# Patient Record
Sex: FEMALE | Race: WHITE | NOT HISPANIC OR LATINO | Employment: PART TIME | URBAN - METROPOLITAN AREA
[De-identification: names, ages, dates, MRNs, and addresses within clinical notes are randomized per-mention and may not be internally consistent; named-entity substitution may affect disease eponyms.]

---

## 2018-08-24 ENCOUNTER — HOSPITAL ENCOUNTER (OUTPATIENT)
Dept: GENERAL RADIOLOGY | Facility: HOSPITAL | Age: 18
Discharge: HOME | End: 2018-08-24
Attending: FAMILY MEDICINE | Admitting: FAMILY MEDICINE

## 2019-05-20 ENCOUNTER — CONVERSION ENCOUNTER (OUTPATIENT)
Dept: FAMILY MEDICINE CLINIC | Facility: CLINIC | Age: 19
End: 2019-05-20

## 2019-06-12 VITALS
RESPIRATION RATE: 18 BRPM | HEIGHT: 63 IN | BODY MASS INDEX: 45.54 KG/M2 | WEIGHT: 257 LBS | DIASTOLIC BLOOD PRESSURE: 80 MMHG | HEART RATE: 84 BPM | OXYGEN SATURATION: 98 % | SYSTOLIC BLOOD PRESSURE: 130 MMHG

## 2019-07-01 ENCOUNTER — OFFICE VISIT (OUTPATIENT)
Dept: FAMILY MEDICINE CLINIC | Facility: CLINIC | Age: 19
End: 2019-07-01

## 2019-07-01 VITALS
DIASTOLIC BLOOD PRESSURE: 81 MMHG | SYSTOLIC BLOOD PRESSURE: 120 MMHG | WEIGHT: 257.8 LBS | OXYGEN SATURATION: 96 % | BODY MASS INDEX: 45.68 KG/M2 | TEMPERATURE: 98.2 F | HEART RATE: 101 BPM | HEIGHT: 63 IN | RESPIRATION RATE: 18 BRPM

## 2019-07-01 DIAGNOSIS — J45.21 MILD INTERMITTENT ASTHMA WITH ACUTE EXACERBATION: Primary | ICD-10-CM

## 2019-07-01 DIAGNOSIS — J06.9 ACUTE URI: ICD-10-CM

## 2019-07-01 PROCEDURE — 99213 OFFICE O/P EST LOW 20 MIN: CPT | Performed by: FAMILY MEDICINE

## 2019-07-01 RX ORDER — ALBUTEROL SULFATE 90 UG/1
2 AEROSOL, METERED RESPIRATORY (INHALATION) EVERY 4 HOURS PRN
Qty: 1 INHALER | Refills: 1 | Status: SHIPPED | OUTPATIENT
Start: 2019-07-01 | End: 2021-06-30 | Stop reason: SDUPTHER

## 2019-07-01 RX ORDER — AZITHROMYCIN 250 MG/1
TABLET, FILM COATED ORAL
Qty: 6 TABLET | Refills: 1 | Status: SHIPPED | OUTPATIENT
Start: 2019-07-01 | End: 2020-07-01

## 2019-07-01 RX ORDER — PREDNISONE 20 MG/1
40 TABLET ORAL DAILY
Qty: 12 TABLET | Refills: 0 | Status: SHIPPED | OUTPATIENT
Start: 2019-07-01 | End: 2019-07-07

## 2019-07-01 NOTE — PROGRESS NOTES
Chief Complaint   Patient presents with   • URI       History of Present Illness:  Subjective   Kelsi Holland is a 18 y.o. female.   URI    This is a recurrent problem. The current episode started in the past 7 days (3-4 days ). The problem has been gradually worsening. Maximum temperature: She states she has not taken her temp but she states that she has felt really hot and then cold and she states she felt like she may have had one  Associated symptoms include congestion, coughing, ear pain, nausea, a plugged ear sensation, sinus pain, sneezing, a sore throat and wheezing. Pertinent negatives include no abdominal pain, chest pain, diarrhea, dysuria, headaches, joint pain, joint swelling, neck pain, rash, rhinorrhea, swollen glands or vomiting. She has tried increased fluids (Mucinex DM night with no relief ) for the symptoms. The treatment provided no relief.        Allergies:  No Known Allergies    Social History:  Social History     Socioeconomic History   • Marital status: Single     Spouse name: Not on file   • Number of children: Not on file   • Years of education: Not on file   • Highest education level: Not on file       Family History:  History reviewed. No pertinent family history.    Past Medical History :  Active Ambulatory Problems     Diagnosis Date Noted   • Mild intermittent asthma with acute exacerbation 07/01/2019     Resolved Ambulatory Problems     Diagnosis Date Noted   • No Resolved Ambulatory Problems     No Additional Past Medical History       Medication List:    Current Outpatient Medications:   •  albuterol sulfate HFA (VENTOLIN HFA) 108 (90 Base) MCG/ACT inhaler, Inhale 2 puffs Every 4 (Four) Hours As Needed for Wheezing., Disp: 1 inhaler, Rfl: 1  •  azithromycin (ZITHROMAX Z-ANGIE) 250 MG tablet, Take 2 tablets the first day, then 1 tablet daily for 4 days., Disp: 6 tablet, Rfl: 1  •  predniSONE (DELTASONE) 20 MG tablet, Take 2 tablets by mouth Daily for 6 days., Disp: 12 tablet, Rfl:  "0    Past Surgical History:  History reviewed. No pertinent surgical history.    Review Of Systems:  Review of Systems   HENT: Positive for congestion, ear pain, sneezing and sore throat. Negative for rhinorrhea.    Respiratory: Positive for cough and wheezing.    Cardiovascular: Negative for chest pain.   Gastrointestinal: Positive for nausea. Negative for abdominal pain, diarrhea and vomiting.   Genitourinary: Negative for dysuria.   Musculoskeletal: Negative for joint pain and neck pain.   Skin: Negative for rash.       The following portions of the patient's history were reviewed and updated as appropriate: allergies, current medications, past family history, past medical history, past social history, past surgical history and problem list.      Physical Exam:  Vital Signs:  /81   Pulse 101   Temp 98.2 °F (36.8 °C)   Resp 18   Ht 160 cm (63\")   Wt 117 kg (257 lb 12.8 oz)   SpO2 96%   BMI 45.67 kg/m²     Physical Exam   Constitutional: She is oriented to person, place, and time. She appears well-developed and well-nourished. She is active.   HENT:   Head: Normocephalic and atraumatic.   Nose: Nose normal.   Eyes: Conjunctivae and lids are normal. Pupils are equal, round, and reactive to light.   Neck: Normal range of motion. Neck supple.   Cardiovascular: Normal rate, regular rhythm, normal heart sounds and normal pulses.   Pulmonary/Chest: No respiratory distress. She has wheezes. She has rales.   Abdominal: Soft. Bowel sounds are normal.   Musculoskeletal: Normal range of motion.   Neurological: She is alert and oriented to person, place, and time.   Skin: Skin is warm and dry. Capillary refill takes less than 2 seconds.   Psychiatric: She has a normal mood and affect. Her behavior is normal. Judgment and thought content normal.   Vitals reviewed.        Assessment and Plan:  Diagnoses and all orders for this visit:    1. Mild intermittent asthma with acute exacerbation (Primary)  -     predniSONE " (DELTASONE) 20 MG tablet; Take 2 tablets by mouth Daily for 6 days.  Dispense: 12 tablet; Refill: 0  -     azithromycin (ZITHROMAX Z-ANGIE) 250 MG tablet; Take 2 tablets the first day, then 1 tablet daily for 4 days.  Dispense: 6 tablet; Refill: 1  -     albuterol sulfate HFA (VENTOLIN HFA) 108 (90 Base) MCG/ACT inhaler; Inhale 2 puffs Every 4 (Four) Hours As Needed for Wheezing.  Dispense: 1 inhaler; Refill: 1    2. Acute URI  Comments:  This is likely the cause of her astham exacerbation.

## 2019-09-30 ENCOUNTER — TELEPHONE (OUTPATIENT)
Dept: FAMILY MEDICINE CLINIC | Facility: CLINIC | Age: 19
End: 2019-09-30

## 2020-03-19 DIAGNOSIS — J02.9 SORE THROAT: Primary | ICD-10-CM

## 2020-03-19 RX ORDER — AMOXICILLIN AND CLAVULANATE POTASSIUM 500; 125 MG/1; MG/1
1 TABLET, FILM COATED ORAL 2 TIMES DAILY
Qty: 20 TABLET | Refills: 0 | Status: SHIPPED | OUTPATIENT
Start: 2020-03-19 | End: 2020-07-01

## 2020-03-20 ENCOUNTER — TELEPHONE (OUTPATIENT)
Dept: FAMILY MEDICINE CLINIC | Facility: CLINIC | Age: 20
End: 2020-03-20

## 2020-03-20 NOTE — TELEPHONE ENCOUNTER
Patient called in with URI, cough, chest tightness, SOA, sore throat. She advised to go to urgent care per Dr. Davis.

## 2020-07-01 ENCOUNTER — OFFICE VISIT (OUTPATIENT)
Dept: FAMILY MEDICINE CLINIC | Facility: CLINIC | Age: 20
End: 2020-07-01

## 2020-07-01 VITALS
HEIGHT: 63 IN | BODY MASS INDEX: 46.6 KG/M2 | RESPIRATION RATE: 18 BRPM | OXYGEN SATURATION: 97 % | DIASTOLIC BLOOD PRESSURE: 77 MMHG | TEMPERATURE: 98 F | HEART RATE: 73 BPM | WEIGHT: 263 LBS | SYSTOLIC BLOOD PRESSURE: 114 MMHG

## 2020-07-01 DIAGNOSIS — E28.2 PCOS (POLYCYSTIC OVARIAN SYNDROME): Primary | ICD-10-CM

## 2020-07-01 DIAGNOSIS — M25.531 WRIST PAIN, ACUTE, RIGHT: ICD-10-CM

## 2020-07-01 DIAGNOSIS — R00.2 HEART PALPITATIONS: ICD-10-CM

## 2020-07-01 PROCEDURE — 99213 OFFICE O/P EST LOW 20 MIN: CPT | Performed by: FAMILY MEDICINE

## 2020-07-01 RX ORDER — PROPRANOLOL HYDROCHLORIDE 40 MG/1
40 TABLET ORAL 2 TIMES DAILY PRN
Qty: 60 TABLET | Refills: 3 | Status: SHIPPED | OUTPATIENT
Start: 2020-07-01 | End: 2021-06-30

## 2020-07-01 RX ORDER — NORETHINDRONE ACETATE AND ETHINYL ESTRADIOL 1; .02 MG/1; MG/1
1 TABLET ORAL DAILY
Qty: 28 TABLET | Refills: 12 | Status: SHIPPED | OUTPATIENT
Start: 2020-07-01 | End: 2020-11-03

## 2020-07-01 NOTE — ASSESSMENT & PLAN NOTE
Patient was started on birth control pills to control her periods and metformin for PCOS.  Patient was encouraged return the clinic given 3 months for follow-up.

## 2020-07-01 NOTE — PROGRESS NOTES
"Chief Complaint   Patient presents with   • Wrist Pain     right wrist       History of Present Illness:  Lam Holland is a 19 y.o. female.   History of Present Illness   Wrist Pain:   Patient reports that she has had wrist pain for the past couple years. She reports sometimes she gets \"flare ups\" and during this time- she will have a noticeable bump on her hand/wrist. She reports that it has been continuous pain for just over a week- she was using her hands to dye and make bouquets for her wedding.   At this time she is complaining of pain with movement and she is right hand dominant. 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   She reports she has had decreased range of motion in her wrist and pain. She reports that when she over uses her hands/wrist this happens and it causes her hand/wrist to be sore.     Allergies:  No Known Allergies    Social History:  Social History     Socioeconomic History   • Marital status: Single     Spouse name: Not on file   • Number of children: Not on file   • Years of education: Not on file   • Highest education level: Not on file       Family History:  No family history on file.    Past Medical History :  Active Ambulatory Problems     Diagnosis Date Noted   • Mild intermittent asthma with acute exacerbation 07/01/2019   • Heart palpitations 07/01/2020   • PCOS (polycystic ovarian syndrome) 07/01/2020   • Wrist pain, acute, right 07/01/2020     Resolved Ambulatory Problems     Diagnosis Date Noted   • No Resolved Ambulatory Problems     No Additional Past Medical History       Medication List:  Outpatient Encounter Medications as of 7/1/2020   Medication Sig Dispense Refill   • albuterol sulfate HFA (VENTOLIN HFA) 108 (90 Base) MCG/ACT inhaler Inhale 2 puffs Every 4 (Four) Hours As Needed for Wheezing. 1 inhaler 1   • metFORMIN (GLUCOPHAGE) 500 MG tablet Take 1 tablet by mouth 3 (Three) Times a Day. 90 tablet 6   • norethindrone-ethinyl estradiol (Loestrin 1/20, " "21,) 1-20 MG-MCG per tablet Take 1 tablet by mouth Daily. 28 tablet 12   • propranolol (INDERAL) 40 MG tablet Take 1 tablet by mouth 2 (Two) Times a Day As Needed (palpitations). 60 tablet 3   • [DISCONTINUED] amoxicillin-clavulanate (Augmentin) 500-125 MG per tablet Take 1 tablet by mouth 2 (Two) Times a Day. 20 tablet 0   • [DISCONTINUED] azithromycin (ZITHROMAX Z-ANGIE) 250 MG tablet Take 2 tablets the first day, then 1 tablet daily for 4 days. 6 tablet 1     No facility-administered encounter medications on file as of 7/1/2020.        Past Surgical History:  No past surgical history on file.     The following portions of the patient's history were reviewed and updated as appropriate: allergies, current medications, past family history, past medical history, past social history, past surgical history and problem list.    Review Of Systems:  Review of Systems   Constitutional: Negative for activity change, appetite change and fatigue.   HENT: Negative for ear discharge, ear pain, postnasal drip and rhinorrhea.    Eyes: Negative for double vision and discharge.   Respiratory: Negative for cough, chest tightness and shortness of breath.    Cardiovascular: Positive for palpitations (Intermittent). Negative for chest pain.   Endocrine: Negative for cold intolerance and heat intolerance.   Genitourinary: Positive for menstrual problem (PCOS).   Musculoskeletal: Negative for back pain, gait problem and joint swelling.        Right wrist pain.   Skin: Negative for color change and rash.   Neurological: Negative for dizziness, facial asymmetry and confusion.   Psychiatric/Behavioral: Negative for behavioral problems.       Objective     Physical Exam:  Vital Signs:  Visit Vitals  /77   Pulse 73   Temp 98 °F (36.7 °C)   Resp 18   Ht 160 cm (63\")   Wt 119 kg (263 lb)   SpO2 97%   BMI 46.59 kg/m²       Physical Exam   Constitutional: She is oriented to person, place, and time. She appears well-developed and " well-nourished.   HENT:   Head: Normocephalic.   Right Ear: External ear normal.   Left Ear: External ear normal.   Nose: Nose normal.   Eyes: Conjunctivae are normal.   Neck: Normal range of motion. Neck supple.   Cardiovascular: Normal rate and regular rhythm.   Pulmonary/Chest: Effort normal and breath sounds normal.   Musculoskeletal: Normal range of motion.   Neurological: She is alert and oriented to person, place, and time.   Skin: Skin is warm and dry. Capillary refill takes less than 2 seconds.   Vitals reviewed.      Assessment/Plan   Assessment and Plan:  Diagnoses and all orders for this visit:    1. PCOS (polycystic ovarian syndrome) (Primary)  Assessment & Plan:  Patient was started on birth control pills to control her periods and metformin for PCOS.  Patient was encouraged return the clinic given 3 months for follow-up.    Orders:  -     metFORMIN (GLUCOPHAGE) 500 MG tablet; Take 1 tablet by mouth 3 (Three) Times a Day.  Dispense: 90 tablet; Refill: 6  -     norethindrone-ethinyl estradiol (Loestrin 1/20, 21,) 1-20 MG-MCG per tablet; Take 1 tablet by mouth Daily.  Dispense: 28 tablet; Refill: 12    2. Heart palpitations  Assessment & Plan:  She was started on Propranolol to help with her symptoms.     Orders:  -     propranolol (INDERAL) 40 MG tablet; Take 1 tablet by mouth 2 (Two) Times a Day As Needed (palpitations).  Dispense: 60 tablet; Refill: 3    3. Wrist pain, acute, right  Assessment & Plan:  This appears to be a ganglion cyst.  No prescription to be sent.  Patient was instructed to use heat and ibuprofen to treat her symptoms.

## 2020-11-03 ENCOUNTER — OFFICE VISIT (OUTPATIENT)
Dept: FAMILY MEDICINE CLINIC | Facility: CLINIC | Age: 20
End: 2020-11-03

## 2020-11-03 VITALS
HEIGHT: 64 IN | HEART RATE: 104 BPM | OXYGEN SATURATION: 98 % | DIASTOLIC BLOOD PRESSURE: 80 MMHG | RESPIRATION RATE: 18 BRPM | WEIGHT: 258 LBS | TEMPERATURE: 97.3 F | BODY MASS INDEX: 44.05 KG/M2 | SYSTOLIC BLOOD PRESSURE: 120 MMHG

## 2020-11-03 DIAGNOSIS — R50.9 FEVER, UNSPECIFIED FEVER CAUSE: ICD-10-CM

## 2020-11-03 DIAGNOSIS — J30.1 SEASONAL ALLERGIC RHINITIS DUE TO POLLEN: ICD-10-CM

## 2020-11-03 DIAGNOSIS — R05.9 COUGH: ICD-10-CM

## 2020-11-03 DIAGNOSIS — J06.9 UPPER RESPIRATORY TRACT INFECTION, UNSPECIFIED TYPE: Primary | ICD-10-CM

## 2020-11-03 PROBLEM — Z87.891 HISTORY OF TOBACCO USE: Status: ACTIVE | Noted: 2020-11-03

## 2020-11-03 PROBLEM — R06.89 BREATHING DIFFICULTY: Status: ACTIVE | Noted: 2020-11-03

## 2020-11-03 PROBLEM — Q62.5: Status: ACTIVE | Noted: 2020-11-03

## 2020-11-03 PROBLEM — IMO0002 BODY MASS INDEX 95-99% FOR AGE, OBESE CHILD WEIGHT MANAGE/MULTIDISCIPL: Status: ACTIVE | Noted: 2020-11-03

## 2020-11-03 PROBLEM — N94.6 DYSMENORRHEA: Status: ACTIVE | Noted: 2020-11-03

## 2020-11-03 PROBLEM — Z30.013 ENCOUNTER FOR INITIAL PRESCRIPTION OF INJECTABLE CONTRACEPTIVE: Status: ACTIVE | Noted: 2020-11-03

## 2020-11-03 PROBLEM — E66.9 BODY MASS INDEX 95-99% FOR AGE, OBESE CHILD WEIGHT MANAGE/MULTIDISCIPL: Status: ACTIVE | Noted: 2020-11-03

## 2020-11-03 PROBLEM — J30.9 ALLERGIC RHINITIS: Status: ACTIVE | Noted: 2020-11-03

## 2020-11-03 PROBLEM — Z13.9 ENCOUNTER FOR SCREENING: Status: ACTIVE | Noted: 2020-11-03

## 2020-11-03 PROBLEM — L03.113 CELLULITIS OF RIGHT UPPER EXTREMITY: Status: ACTIVE | Noted: 2020-11-03

## 2020-11-03 PROBLEM — Z30.09 BIRTH CONTROL COUNSELING: Status: ACTIVE | Noted: 2020-11-03

## 2020-11-03 PROBLEM — Z02.1 PHYSICAL EXAM, PRE-EMPLOYMENT: Status: ACTIVE | Noted: 2020-11-03

## 2020-11-03 PROBLEM — L20.9 ATOPIC DERMATITIS AND RELATED CONDITION: Status: ACTIVE | Noted: 2020-11-03

## 2020-11-03 PROBLEM — M13.0 POLYARTHROPATHY OR POLYARTHRITIS: Status: ACTIVE | Noted: 2020-11-03

## 2020-11-03 LAB
EXPIRATION DATE: NORMAL
FLUAV AG NPH QL: NEGATIVE
FLUBV AG NPH QL: NEGATIVE
INTERNAL CONTROL: NORMAL
Lab: NORMAL

## 2020-11-03 PROCEDURE — 87804 INFLUENZA ASSAY W/OPTIC: CPT | Performed by: FAMILY MEDICINE

## 2020-11-03 PROCEDURE — 99213 OFFICE O/P EST LOW 20 MIN: CPT | Performed by: FAMILY MEDICINE

## 2020-11-03 RX ORDER — MONTELUKAST SODIUM 10 MG/1
10 TABLET ORAL NIGHTLY
Qty: 30 TABLET | Refills: 12 | Status: SHIPPED | OUTPATIENT
Start: 2020-11-03 | End: 2021-06-30

## 2020-11-03 NOTE — PROGRESS NOTES
Subjective   Kelsi Holland is a 20 y.o. female.     Chief Complaint   Patient presents with   • URI       Kelsi was last Covid tested on 09/30/2020 Negative    URI   This is a new problem. The current episode started in the past 7 days. The problem has been gradually worsening. There has been no fever (Yesterday fever 100.5). Associated symptoms include chest pain, congestion, coughing, headaches, nausea, rhinorrhea and swollen glands. Pertinent negatives include no abdominal pain, diarrhea, dysuria, ear pain, plugged ear sensation, rash, sinus pain, sore throat, vomiting or wheezing. Associated symptoms comments: Eye pain. She has tried antihistamine for the symptoms. The treatment provided mild relief.        The following portions of the patient's history were reviewed and updated as appropriate: allergies, current medications, past family history, past medical history, past social history, past surgical history and problem list.    Allergies:  No Known Allergies    Social History:  Social History     Socioeconomic History   • Marital status:      Spouse name: Not on file   • Number of children: Not on file   • Years of education: Not on file   • Highest education level: Not on file   Tobacco Use   • Smoking status: Never Smoker   • Smokeless tobacco: Never Used   Substance and Sexual Activity   • Alcohol use: Never     Frequency: Never   • Drug use: Never   • Sexual activity: Defer       Family History:  Family History   Problem Relation Age of Onset   • Breast cancer Mother        Past Medical History :  Patient Active Problem List   Diagnosis   • Mild intermittent asthma with acute exacerbation   • Heart palpitations   • PCOS (polycystic ovarian syndrome)   • Wrist pain, acute, right   • History of chickenpox   • Allergic rhinitis   • Asthma   • Atopic dermatitis and related condition   • Body mass index 95-99% for age, obese child weight manage/multidiscipl   • Cellulitis of right upper extremity   •  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 1 inhaler 1   • metFORMIN (GLUCOPHAGE) 500 MG tablet Take 1 tablet by mouth 3 (Three) Times a Day. 90 tablet 6   • propranolol (INDERAL) 40 MG tablet Take 1 tablet by mouth 2 (Two) Times a Day As Needed (palpitations). 60 tablet 3   • montelukast (SINGULAIR) 10 MG tablet Take 1 tablet by mouth Every Night. 30 tablet 12   • [DISCONTINUED] norethindrone-ethinyl estradiol (Loestrin 1/20, 21,) 1-20 MG-MCG per tablet Take 1 tablet by mouth Daily. 28 tablet 12     No facility-administered encounter medications on file as of 11/3/2020.        Past Surgical History:  History reviewed. No pertinent surgical history.    Review of Systems:  Review of Systems   Constitutional: Positive for fever (low grade 100.5). Negative for unexpected weight gain and unexpected weight loss.   HENT: Positive for congestion, rhinorrhea, sinus pressure and swollen glands. Negative for ear pain and sore throat.    Eyes: Negative for visual disturbance.   Respiratory: Positive for cough. Negative for shortness of breath and wheezing.    Cardiovascular: Positive for chest pain. Negative for palpitations and leg swelling.   Gastrointestinal: Positive for nausea. Negative for abdominal pain, diarrhea and vomiting.   Endocrine: Negative for cold intolerance, heat intolerance, polydipsia and polyuria.   Genitourinary: Negative for dysuria.   Musculoskeletal: Negative for arthralgias and myalgias.  "  Skin: Negative for rash.   Allergic/Immunologic: Positive for environmental allergies.   Neurological: Negative for weakness, numbness and headache.   Hematological: Negative for adenopathy.       I have reviewed and confirmed the accuracy of the HPI and ROS as documented by the MA/LPN/RN Samantha Hobson MD    Vital Signs:  Visit Vitals  /80 (BP Location: Right arm, Patient Position: Sitting, Cuff Size: Adult)   Pulse 104   Temp 97.3 °F (36.3 °C) (Temporal)   Resp 18   Ht 162.6 cm (64\")   Wt 117 kg (258 lb)   LMP 10/01/2020   SpO2 98% Comment: room air   BMI 44.29 kg/m²       Physical Exam  Vitals signs reviewed.   Constitutional:       General: She is not in acute distress.     Appearance: She is well-developed.   HENT:      Right Ear: Tympanic membrane and external ear normal.      Left Ear: Tympanic membrane and external ear normal.      Mouth/Throat:      Pharynx: No oropharyngeal exudate or posterior oropharyngeal erythema.   Eyes:      Conjunctiva/sclera: Conjunctivae normal.   Neck:      Musculoskeletal: Neck supple.      Thyroid: No thyromegaly.      Vascular: No JVD.   Cardiovascular:      Rate and Rhythm: Normal rate and regular rhythm.      Heart sounds: Normal heart sounds. No murmur. No friction rub. No gallop.    Pulmonary:      Effort: Pulmonary effort is normal. No respiratory distress.      Breath sounds: Normal breath sounds. No wheezing or rales.   Abdominal:      General: Bowel sounds are normal. There is no distension.      Palpations: Abdomen is soft. There is no mass.      Tenderness: There is no abdominal tenderness.   Lymphadenopathy:      Cervical: No cervical adenopathy.   Skin:     General: Skin is warm.      Findings: No erythema or rash.   Neurological:      Mental Status: She is alert and oriented to person, place, and time.      Coordination: Coordination normal.         Assessment and Plan:  Problem List Items Addressed This Visit        Unprioritized    Allergic rhinitis "    Overview     Exacerbation on meds, add montelukast,  avoidance, saline flushes , notify us of no improvement         Relevant Medications    montelukast (SINGULAIR) 10 MG tablet      Other Visit Diagnoses     Upper respiratory tract infection, unspecified type    -  Primary    Suspect sxs are secondary to allergies, Flu swab negative. She will rest push fluids covid testing notify her of results. She will notify us if sxs worsen.     Cough        Relevant Orders    COVID-19,[facility] ROUTINE, NP/OP SWAB IN TRANSPORT MEDIA OR ESWAB 72 HR TAT - Swab, Anterior nasal (Completed)    Fever, unspecified fever cause        Relevant Orders    POC Influenza A / B (Completed)          An After Visit Summary and PPPS were given to the patient.       I wore protective equipment throughout this patient encounter to include mask and eye protection. Hand hygiene was performed before donning protective equipment and after removal when leaving the room.

## 2020-11-04 LAB — SARS-COV-2 RNA RESP QL NAA+PROBE: DETECTED

## 2020-11-06 ENCOUNTER — TELEPHONE (OUTPATIENT)
Dept: FAMILY MEDICINE CLINIC | Facility: CLINIC | Age: 20
End: 2020-11-06

## 2020-11-06 NOTE — TELEPHONE ENCOUNTER
Spoke with Hope,  She report no fever, slight  cough, unable to smell or taste., but over all feeling much better.

## 2020-11-08 ENCOUNTER — HOSPITAL ENCOUNTER (EMERGENCY)
Facility: HOSPITAL | Age: 20
Discharge: HOME | End: 2020-11-08
Attending: EMERGENCY MEDICINE | Admitting: EMERGENCY MEDICINE

## 2020-11-08 VITALS
SYSTOLIC BLOOD PRESSURE: 143 MMHG | HEART RATE: 93 BPM | OXYGEN SATURATION: 98 % | TEMPERATURE: 99.3 F | WEIGHT: 255.07 LBS | BODY MASS INDEX: 43.55 KG/M2 | DIASTOLIC BLOOD PRESSURE: 94 MMHG | HEIGHT: 64 IN | RESPIRATION RATE: 20 BRPM

## 2020-11-08 DIAGNOSIS — M79.604 RIGHT LEG PAIN: ICD-10-CM

## 2020-11-08 DIAGNOSIS — U07.1 COVID-19: Primary | ICD-10-CM

## 2020-11-08 PROCEDURE — 99281 EMR DPT VST MAYX REQ PHY/QHP: CPT

## 2020-11-09 NOTE — ED NOTES
"Patient stated she began having right leg pain. Patient stated \"it started once this virus started.\" Patient stated her right leg pain \"shoots down from my butt.\"     Kevan Asher RN  11/08/20 [id number]    "

## 2020-11-09 NOTE — ED PROVIDER NOTES
Subjective   History of Present Illness  20-year-old female presents Covid positive.  She was tested on the third.  She states her employer told her she needed to come in for evaluation tonight.  She states her temperature was 99 earlier today.  She has had cough.She complains of pain right leg lateral thigh.  She has had this for about 3 days.      Review of Systems  Negative for vomiting diarrhea   No past medical history on file.  Polycystic ovary disease  No Known Allergies    No past surgical history on file.    Family History   Problem Relation Age of Onset   • Breast cancer Mother        Social History     Socioeconomic History   • Marital status:      Spouse name: Not on file   • Number of children: Not on file   • Years of education: Not on file   • Highest education level: Not on file   Tobacco Use   • Smoking status: Never Smoker   • Smokeless tobacco: Never Used   Substance and Sexual Activity   • Alcohol use: Never     Frequency: Never   • Drug use: Never   • Sexual activity: Defer     Prior to Admission medications    Medication Sig Start Date End Date Taking? Authorizing Provider   albuterol sulfate HFA (VENTOLIN HFA) 108 (90 Base) MCG/ACT inhaler Inhale 2 puffs Every 4 (Four) Hours As Needed for Wheezing. 7/1/19   Richie Davis Sr., MD   metFORMIN (GLUCOPHAGE) 500 MG tablet Take 1 tablet by mouth 3 (Three) Times a Day. 7/1/20   Richie Davis Sr., MD   montelukast (SINGULAIR) 10 MG tablet Take 1 tablet by mouth Every Night. 11/3/20   Samantha Hobson MD   propranolol (INDERAL) 40 MG tablet Take 1 tablet by mouth 2 (Two) Times a Day As Needed (palpitations). 7/1/20   Richie Davis Sr., MD           Objective   Physical Exam  20-year-old female awake alert.  Generally overweight.  Pupils equal round react light.  Neck supple chest clear cardiovascular a rhythm abdomen soft nontender examination of legs complains of some pain in lateral aspect of right thigh.  There is no calf tenderness negative  Homans.  Reflexes 2+ symmetric.  Skin without rash noted.  Procedures           ED Course                                           MDM  Patient's findings were discussed with her.  She has pulse ox of 98% respirate of 20.  She has no evidence of suggest DVT in right leg on exam.  At this point she needs to continue with symptomatic treatment.  Advised to take 1 aspirin daily.  She also can use vitamin C zinc and vitamin D.  To follow-up with her PMD return new or worsening symptoms  Final diagnoses:   COVID-19   Right leg pain            Moises Moseley MD  11/08/20 [id number]

## 2020-11-09 NOTE — DISCHARGE INSTRUCTIONS
May use Tylenol, ibuprofen for pain or fever, take vitamin C vitamin D and zinc daily, take 1 aspirin daily, return new or worsening symptoms

## 2021-02-03 ENCOUNTER — TELEPHONE (OUTPATIENT)
Dept: FAMILY MEDICINE CLINIC | Facility: CLINIC | Age: 21
End: 2021-02-03

## 2021-02-05 ENCOUNTER — OFFICE VISIT (OUTPATIENT)
Dept: FAMILY MEDICINE CLINIC | Facility: CLINIC | Age: 21
End: 2021-02-05

## 2021-02-05 VITALS
SYSTOLIC BLOOD PRESSURE: 119 MMHG | TEMPERATURE: 98 F | BODY MASS INDEX: 44.05 KG/M2 | DIASTOLIC BLOOD PRESSURE: 84 MMHG | WEIGHT: 258 LBS | HEART RATE: 81 BPM | RESPIRATION RATE: 18 BRPM | OXYGEN SATURATION: 99 % | HEIGHT: 64 IN

## 2021-02-05 DIAGNOSIS — E66.01 MORBIDLY OBESE (HCC): ICD-10-CM

## 2021-02-05 DIAGNOSIS — S46.812A STRAIN OF LEFT TRAPEZIUS MUSCLE, INITIAL ENCOUNTER: ICD-10-CM

## 2021-02-05 DIAGNOSIS — S29.012A STRAIN OF LATISSIMUS DORSI MUSCLE, INITIAL ENCOUNTER: ICD-10-CM

## 2021-02-05 DIAGNOSIS — T30.0 FRICTION BURN: ICD-10-CM
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
## 2021-02-05 DIAGNOSIS — T70.0XXD OTITIC BAROTRAUMA, SUBSEQUENT ENCOUNTER: ICD-10-CM

## 2021-02-05 PROBLEM — T70.0XXA OTITIC BAROTRAUMA: Status: ACTIVE | Noted: 2021-02-05

## 2021-02-05 PROCEDURE — 99213 OFFICE O/P EST LOW 20 MIN: CPT | Performed by: FAMILY MEDICINE

## 2021-02-05 RX ORDER — METHYLPREDNISOLONE 4 MG/1
TABLET ORAL
COMMUNITY
Start: 2021-02-03 | End: 2021-02-10

## 2021-02-05 RX ORDER — TIZANIDINE 4 MG/1
4 TABLET ORAL NIGHTLY PRN
Qty: 30 TABLET | Refills: 0 | Status: SHIPPED | OUTPATIENT
Start: 2021-02-05 | End: 2021-04-08

## 2021-02-05 RX ORDER — GABAPENTIN 300 MG/1
300 CAPSULE ORAL DAILY
Qty: 30 CAPSULE | Refills: 12 | Status: SHIPPED | OUTPATIENT
Start: 2021-02-05 | End: 2021-02-18

## 2021-02-05 NOTE — ASSESSMENT & PLAN NOTE
Strain in the latissimus dorsi is due to the accident.  Patient was already given prescription for Medrol Dosepak and diclofenac.  Patient was also given prescription of gabapentin and tizanidine to help with muscle strain.
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
## 2021-02-10 ENCOUNTER — OFFICE VISIT (OUTPATIENT)
Dept: FAMILY MEDICINE CLINIC | Facility: CLINIC | Age: 21
End: 2021-02-10

## 2021-02-10 VITALS
WEIGHT: 259.6 LBS | SYSTOLIC BLOOD PRESSURE: 126 MMHG | BODY MASS INDEX: 44.32 KG/M2 | OXYGEN SATURATION: 98 % | HEART RATE: 92 BPM | TEMPERATURE: 98.1 F | HEIGHT: 64 IN | DIASTOLIC BLOOD PRESSURE: 80 MMHG | RESPIRATION RATE: 18 BRPM

## 2021-02-10 DIAGNOSIS — Z13.220 SCREENING FOR HYPERLIPIDEMIA: ICD-10-CM

## 2021-02-10 DIAGNOSIS — Z13.29 SCREENING FOR HYPOTHYROIDISM: ICD-10-CM

## 2021-02-10 DIAGNOSIS — R53.81 MALAISE AND FATIGUE: ICD-10-CM

## 2021-02-10 DIAGNOSIS — Z00.00 PHYSICAL EXAM: Primary | ICD-10-CM

## 2021-02-10 DIAGNOSIS — R53.83 MALAISE AND FATIGUE: ICD-10-CM

## 2021-02-10 DIAGNOSIS — E66.01 MORBID (SEVERE) OBESITY DUE TO EXCESS CALORIES (HCC): ICD-10-CM

## 2021-02-10 PROBLEM — J45.909 ASTHMA: Status: ACTIVE | Noted: 2021-02-10

## 2021-02-10 PROCEDURE — 99395 PREV VISIT EST AGE 18-39: CPT | Performed by: FAMILY MEDICINE

## 2021-02-10 RX ORDER — PHENTERMINE HYDROCHLORIDE 37.5 MG/1
37.5 CAPSULE ORAL EVERY MORNING
Qty: 30 CAPSULE | Refills: 0 | Status: SHIPPED | OUTPATIENT
Start: 2021-02-10 | End: 2021-03-11 | Stop reason: SDUPTHER

## 2021-02-10 NOTE — PROGRESS NOTES
"Chief Complaint  Annual Exam    Subjective    History of Present Illness        Kelsi Holland presents to [facility] for     Hope KASEY Holland is a 20 y.o. female here for her annual physical with me. Kelsi is here for coordination of medical care, to discuss health maintenance, disease prevention as well as to followup on medical problems. Patient has been followed by me for some time now. Patient's last CPE was last year . Activity level is minimal. Exercises 3 per week. Appetite is fair. Feels well with few complaints. Energy level is fair. Sleeps well. Patient is doing routine self skin exam monthly. Patient is doing routine self-breast exams monthly.         Objective   Vital Signs:   Visit Vitals  /80   Pulse 92   Temp 98.1 °F (36.7 °C)   Resp 18   Ht 162.6 cm (64\")   Wt 118 kg (259 lb 9.6 oz)   SpO2 98%   BMI 44.56 kg/m²       Physical Exam  Vitals signs reviewed.   Constitutional:       Appearance: She is well-developed.   HENT:      Head: Normocephalic and atraumatic.      Nose: Nose normal.   Eyes:      General: Lids are normal.      Conjunctiva/sclera: Conjunctivae normal.      Pupils: Pupils are equal, round, and reactive to light.   Neck:      Musculoskeletal: Normal range of motion and neck supple.   Cardiovascular:      Rate and Rhythm: Normal rate and regular rhythm.      Pulses: Normal pulses.      Heart sounds: Normal heart sounds.   Pulmonary:      Effort: Pulmonary effort is normal. No respiratory distress.      Breath sounds: Normal breath sounds.   Abdominal:      General: Bowel sounds are normal.      Palpations: Abdomen is soft.   Musculoskeletal: Normal range of motion.   Skin:     General: Skin is warm and dry.      Capillary Refill: Capillary refill takes less than 2 seconds.   Neurological:      Mental Status: She is alert and oriented to person, place, and time.   Psychiatric:         Behavior: Behavior normal.         Thought Content: Thought content " normal.         Judgment: Judgment normal.            Result Review :                    Assessment and Plan      Diagnoses and all orders for this visit:    1. Physical exam (Primary)  Assessment & Plan:  Discussed injury prevention, diet and exercise, safe sexual practices, and screening for common diseases. Encouraged use of sunscreen and seatbelts. Discussed timing of  cervical cancer screening. Encouraged monthly self-breast exams, yearly clinical breast exams, and discussed timing of mammograms. Avoidance of tobacco encouraged. Limitation or avoidance of alcohol encouraged. Recommend yearly dental and eye exams. Also discussed monitoring of blood pressure, lipids.      2. Screening for hyperlipidemia  -     CBC & Differential  -     Comprehensive Metabolic Panel  -     Lipid Panel With / Chol / HDL Ratio    3. Screening for hypothyroidism  -     TSH    4. Malaise and fatigue  -     Vitamin D 25 Hydroxy  -     Vitamin B12  -     Folate    5. Morbid (severe) obesity due to excess calories (CMS/[facility])  Assessment & Plan:  Patient's (Body mass index is 44.56 kg/m².) indicates that they are obese (BMI >30) with obesity-related health conditions that include none . Obesity is worsening. BMI is is above average; BMI management plan is completed. We discussed portion control, increasing exercise and pharmacologic options including phenteramine.     Orders:  -     phentermine 37.5 MG capsule; Take 1 capsule by mouth Every Morning.  Dispense: 30 capsule; Refill: 0    Other orders  -     Cancel: Hemoglobin A1c           Follow Up   No follow-ups on file.  Patient was given instructions and counseling regarding her condition or for health maintenance advice. Please see specific information pulled into the AVS if appropriate.

## 2021-02-11 PROBLEM — Z00.00 PHYSICAL EXAM: Status: ACTIVE | Noted: 2021-02-11

## 2021-02-11 LAB
25(OH)D3+25(OH)D2 SERPL-MCNC: 28.9 NG/ML (ref 30–100)
ALBUMIN SERPL-MCNC: 4 G/DL (ref 3.9–5)
ALBUMIN/GLOB SERPL: 1.4 {RATIO} (ref 1.2–2.2)
ALP SERPL-CCNC: 90 IU/L (ref 39–117)
ALT SERPL-CCNC: 21 IU/L (ref 0–32)
AST SERPL-CCNC: 13 IU/L (ref 0–40)
BASOPHILS # BLD AUTO: 0.1 X10E3/UL (ref 0–0.2)
BASOPHILS NFR BLD AUTO: 1 %
BILIRUB SERPL-MCNC: 0.2 MG/DL (ref 0–1.2)
BUN SERPL-MCNC: 17 MG/DL (ref 6–20)
BUN/CREAT SERPL: 25 (ref 9–23)
CALCIUM SERPL-MCNC: 9.6 MG/DL (ref 8.7–10.2)
CHLORIDE SERPL-SCNC: 105 MMOL/L (ref 96–106)
CHOLEST SERPL-MCNC: 150 MG/DL (ref 100–199)
CHOLEST/HDLC SERPL: 3.2 RATIO (ref 0–4.4)
CO2 SERPL-SCNC: 23 MMOL/L (ref 20–29)
CREAT SERPL-MCNC: 0.69 MG/DL (ref 0.57–1)
EOSINOPHIL # BLD AUTO: 0.1 X10E3/UL (ref 0–0.4)
EOSINOPHIL NFR BLD AUTO: 2 %
ERYTHROCYTE [DISTWIDTH] IN BLOOD BY AUTOMATED COUNT: 13.7 % (ref 11.7–15.4)
FOLATE SERPL-MCNC: 8.8 NG/ML
GLOBULIN SER CALC-MCNC: 2.9 G/DL (ref 1.5–4.5)
GLUCOSE SERPL-MCNC: 83 MG/DL (ref 65–99)
HCT VFR BLD AUTO: 40.6 % (ref 34–46.6)
HDLC SERPL-MCNC: 47 MG/DL
HGB BLD-MCNC: 13.6 G/DL (ref 11.1–15.9)
IMM GRANULOCYTES # BLD AUTO: 0 X10E3/UL (ref 0–0.1)
IMM GRANULOCYTES NFR BLD AUTO: 0 %
LDLC SERPL CALC-MCNC: 80 MG/DL (ref 0–99)
LYMPHOCYTES # BLD AUTO: 2.4 X10E3/UL (ref 0.7–3.1)
LYMPHOCYTES NFR BLD AUTO: 26 %
MCH RBC QN AUTO: 26.7 PG (ref 26.6–33)
MCHC RBC AUTO-ENTMCNC: 33.5 G/DL (ref 31.5–35.7)
MCV RBC AUTO: 80 FL (ref 79–97)
MONOCYTES # BLD AUTO: 0.7 X10E3/UL (ref 0.1–0.9)
MONOCYTES NFR BLD AUTO: 7 %
NEUTROPHILS # BLD AUTO: 6 X10E3/UL (ref 1.4–7)
NEUTROPHILS NFR BLD AUTO: 64 %
PLATELET # BLD AUTO: 361 X10E3/UL (ref 150–450)
POTASSIUM SERPL-SCNC: 4.8 MMOL/L (ref 3.5–5.2)
PROT SERPL-MCNC: 6.9 G/DL (ref 6–8.5)
RBC # BLD AUTO: 5.09 X10E6/UL (ref 3.77–5.28)
SODIUM SERPL-SCNC: 142 MMOL/L (ref 134–144)
TRIGL SERPL-MCNC: 130 MG/DL (ref 0–149)
TSH SERPL DL<=0.005 MIU/L-ACNC: 1.89 UIU/ML (ref 0.45–4.5)
VIT B12 SERPL-MCNC: 520 PG/ML (ref 232–1245)
VLDLC SERPL CALC-MCNC: 23 MG/DL (ref 5–40)
WBC # BLD AUTO: 9.3 X10E3/UL (ref 3.4–10.8)

## 2021-02-11 NOTE — ASSESSMENT & PLAN NOTE
Patient's (Body mass index is 44.56 kg/m².) indicates that they are obese (BMI >30) with obesity-related health conditions that include none . Obesity is worsening. BMI is is above average; BMI management plan is completed. We discussed portion control, increasing exercise and pharmacologic options including phenteramine.

## 2021-02-18 ENCOUNTER — OFFICE VISIT (OUTPATIENT)
Dept: FAMILY MEDICINE CLINIC | Facility: CLINIC | Age: 21
End: 2021-02-18

## 2021-02-18 VITALS
WEIGHT: 253 LBS | SYSTOLIC BLOOD PRESSURE: 121 MMHG | OXYGEN SATURATION: 99 % | TEMPERATURE: 98 F | DIASTOLIC BLOOD PRESSURE: 85 MMHG | HEIGHT: 64 IN | RESPIRATION RATE: 18 BRPM | HEART RATE: 76 BPM | BODY MASS INDEX: 43.19 KG/M2

## 2021-02-18 DIAGNOSIS — E66.01 MORBIDLY OBESE (HCC): ICD-10-CM

## 2021-02-18 DIAGNOSIS — S46.812D STRAIN OF LEFT TRAPEZIUS MUSCLE, SUBSEQUENT ENCOUNTER: ICD-10-CM
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
## 2021-02-18 PROCEDURE — 99213 OFFICE O/P EST LOW 20 MIN: CPT | Performed by: FAMILY MEDICINE
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
## 2021-02-19 NOTE — ASSESSMENT & PLAN NOTE
Patient is still having some discomfort and soreness.  Patient was advised to use heat and massage her muscles.  Patient was advised to return to clinic if her symptoms are not improved in 2 to 4 weeks

## 2021-02-22 ENCOUNTER — PATIENT MESSAGE (OUTPATIENT)
Dept: FAMILY MEDICINE CLINIC | Facility: CLINIC | Age: 21
End: 2021-02-22
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
## 2021-02-22 DIAGNOSIS — S46.812D STRAIN OF LEFT TRAPEZIUS MUSCLE, SUBSEQUENT ENCOUNTER: Primary | ICD-10-CM

## 2021-02-22 DIAGNOSIS — M54.2 NECK PAIN: ICD-10-CM

## 2021-02-25 NOTE — TELEPHONE ENCOUNTER
From: Kelsi Holland  To: Richie Davis Sr, MD  Sent: 2/22/2021 9:29 PM EST  Subject: Visit Follow-Up Question    I hate to keep bugging you guys. I've been taking Tizanidine for pain in my left shoulder. I had mentioned the fear of taking them and was advised to take a half of a pill or less. I came home that night and took a full one because my head and shoulder was killing me. I've since been taking a full one almost every day. I noticed a day I forgot to take it, I woke up extremely sore and was sore with a very bad headache nearly all day the next day. I have also noticed it hurts both shoulders to carry any weight including my purse. My  has tried rubbing my shoulder and it hurts too much. At what point should I come back into the office to get a referral to do some therapy? The pain is to the point by the end of the day it kills me to lift anything or even hold my arms up. Just from walking about 3 blocks to get to work and carrying my purse on my shoulders and then in my hand (alternating), my shoulders were killing me which is also causing more headaches throughout the day,   making it harder to even function. The muscle relaxers I have, work great when they kick in at night to go to bed, but it's hard to fall asleep before they kick in because of the muscle pain.

## 2021-03-11 ENCOUNTER — OFFICE VISIT (OUTPATIENT)
Dept: FAMILY MEDICINE CLINIC | Facility: CLINIC | Age: 21
End: 2021-03-11

## 2021-03-11 VITALS
TEMPERATURE: 98.1 F | HEART RATE: 77 BPM | DIASTOLIC BLOOD PRESSURE: 81 MMHG | HEIGHT: 64 IN | WEIGHT: 246.6 LBS | RESPIRATION RATE: 18 BRPM | BODY MASS INDEX: 42.1 KG/M2 | OXYGEN SATURATION: 99 % | SYSTOLIC BLOOD PRESSURE: 118 MMHG

## 2021-03-11 DIAGNOSIS — E66.01 MORBID (SEVERE) OBESITY DUE TO EXCESS CALORIES (HCC): Primary | ICD-10-CM

## 2021-03-11 DIAGNOSIS — E55.9 VITAMIN D DEFICIENCY: ICD-10-CM

## 2021-03-11 PROCEDURE — 99213 OFFICE O/P EST LOW 20 MIN: CPT | Performed by: FAMILY MEDICINE

## 2021-03-11 RX ORDER — PHENTERMINE HYDROCHLORIDE 37.5 MG/1
37.5 CAPSULE ORAL EVERY MORNING
Qty: 30 CAPSULE | Refills: 0 | Status: SHIPPED | OUTPATIENT
Start: 2021-03-11 | End: 2021-04-08

## 2021-03-11 RX ORDER — PRENATAL VIT NO.126/IRON/FOLIC 28MG-0.8MG
1 TABLET ORAL DAILY
COMMUNITY
End: 2022-02-09

## 2021-03-11 NOTE — PROGRESS NOTES
Chief Complaint  Labs Only, Vitamin D Deficiency, and Obesity    Subjective    History of Present Illness        Kelsi Holland presents to [facility] for   3/11/2021- The patient is here today and she is wanting to follow up on the labs that she had done. Her vitamin d level was low and she states that she was taking a vitamin d over the counter but she states that she started a pre  and she state that this has the vitamin d in it. The rest of her labs looked good over all.       Obesity  This is a chronic problem. The current episode started more than 1 year ago. The problem occurs constantly. The problem has been unchanged. Pertinent negatives include no abdominal pain, anorexia, arthralgias, change in bowel habit, chest pain, chills, congestion, coughing, diaphoresis, fatigue, fever, headaches, joint swelling, myalgias, nausea, neck pain, numbness, rash, sore throat, swollen glands, urinary symptoms, vertigo, visual change, vomiting or weakness. Associated symptoms comments: 3/11/2021- The patient is here today and following up on her weight loss. The last time she was here she was started on Phentermine to try and help her loose some of her weight. She states that he has started some prenatal vitamins as well and she states that these constipated her and she states that she has been watching what she is eating and she states that she has been eating smaller calories as well. She states that she is trying to avoid carb's as well. She states that she is not just wanting to diet as she states that she is trying to make a lifestyle change but she states  that she has been doing what she can and she states that she has seen a difference and she states that the weight is slowly coming off. . Nothing aggravates the symptoms. She has tried nothing for the symptoms. The treatment provided no relief.        Objective   Vital Signs:   Visit Vitals  /81   Pulse 77   Temp 98.1 °F  "(36.7 °C)   Resp 18   Ht 162.6 cm (64\")   Wt 112 kg (246 lb 9.6 oz)   SpO2 99%   BMI 42.33 kg/m²       Physical Exam  Vitals reviewed.   Constitutional:       Appearance: She is well-developed.   HENT:      Head: Normocephalic.      Right Ear: External ear normal.      Left Ear: External ear normal.      Nose: Nose normal.   Eyes:      Conjunctiva/sclera: Conjunctivae normal.   Cardiovascular:      Rate and Rhythm: Normal rate and regular rhythm.   Pulmonary:      Effort: Pulmonary effort is normal.      Breath sounds: Normal breath sounds.   Musculoskeletal:         General: Normal range of motion.      Cervical back: Normal range of motion and neck supple.   Skin:     General: Skin is warm and dry.      Capillary Refill: Capillary refill takes less than 2 seconds.   Neurological:      Mental Status: She is alert and oriented to person, place, and time.            Result Review :      Common labs    Common Labsle 2/10/21 2/10/21 2/10/21    1541 1541 1541   Glucose  83    BUN  17    Creatinine  0.69    eGFR Non  Am  124    eGFR African Am  143    Sodium  142    Potassium  4.8    Chloride  105    Calcium  9.6    Total Protein  6.9    Albumin  4.0    Total Bilirubin  0.2    Alkaline Phosphatase  90    AST (SGOT)  13    ALT (SGPT)  21    WBC 9.3     Hemoglobin 13.6     Hematocrit 40.6     Platelets 361     Total Cholesterol   150   Triglycerides   130   HDL Cholesterol   47   LDL Cholesterol    80           CMP    CMP 2/10/21   Glucose 83   BUN 17   Creatinine 0.69   eGFR Non  Am 124   eGFR African Am 143   Sodium 142   Potassium 4.8   Chloride 105   Calcium 9.6   Total Protein 6.9   Albumin 4.0   Globulin 2.9   Total Bilirubin 0.2   Alkaline Phosphatase 90   AST (SGOT) 13   ALT (SGPT) 21           CBC    CBC 2/10/21   WBC 9.3   RBC 5.09   Hemoglobin 13.6   Hematocrit 40.6   MCV 80   MCH 26.7   MCHC 33.5   RDW 13.7   Platelets 361           CBC w/diff    CBC w/Diff 2/10/21   WBC 9.3   RBC 5.09 "   Hemoglobin 13.6   Hematocrit 40.6   MCV 80   MCH 26.7   MCHC 33.5   RDW 13.7   Platelets 361   Neutrophil Rel % 64   Lymphocyte Rel % 26   Monocyte Rel % 7   Eosinophil Rel % 2   Basophil Rel % 1           Lipid Panel    Lipid Panel 2/10/21   Total Cholesterol 150   Triglycerides 130   HDL Cholesterol 47   VLDL Cholesterol 23   LDL Cholesterol  80           TSH    TSH 2/10/21   TSH 1.890                       Assessment and Plan      Diagnoses and all orders for this visit:    1. Morbid (severe) obesity due to excess calories (CMS/HCC) (Primary)  Assessment & Plan:  Patient's (Body mass index is 42.33 kg/m².) indicates that they are morbidly obese (BMI > 40 or > 35 with obesity - related health condition) with obesity-related health conditions that include none . Obesity is improving with treatment. BMI is is above average; BMI management plan is completed. We discussed portion control, increasing exercise and pharmacologic options including Phentermine.  Patient's weight goal lose 10 pounds in 1 month.    Orders:  -     phentermine 37.5 MG capsule; Take 1 capsule by mouth Every Morning.  Dispense: 30 capsule; Refill: 0    2. Vitamin D deficiency           Follow Up   No follow-ups on file.  Patient was given instructions and counseling regarding her condition or for health maintenance advice. Please see specific information pulled into the AVS if appropriate.

## 2021-03-12 NOTE — ASSESSMENT & PLAN NOTE
Patient's (Body mass index is 42.33 kg/m².) indicates that they are morbidly obese (BMI > 40 or > 35 with obesity - related health condition) with obesity-related health conditions that include none . Obesity is improving with treatment. BMI is is above average; BMI management plan is completed. We discussed portion control, increasing exercise and pharmacologic options including Phentermine.  Patient's weight goal lose 10 pounds in 1 month.

## 2021-03-15 ENCOUNTER — TREATMENT (OUTPATIENT)
Dept: PHYSICAL THERAPY | Facility: CLINIC | Age: 21
End: 2021-03-15

## 2021-03-15 DIAGNOSIS — M54.2 CERVICALGIA: ICD-10-CM

## 2021-03-15 DIAGNOSIS — S46.812A STRAIN OF LEFT TRAPEZIUS MUSCLE, INITIAL ENCOUNTER: Primary | ICD-10-CM

## 2021-03-15 PROCEDURE — 97140 MANUAL THERAPY 1/> REGIONS: CPT | Performed by: PHYSICAL THERAPIST

## 2021-03-15 PROCEDURE — 97014 ELECTRIC STIMULATION THERAPY: CPT | Performed by: PHYSICAL THERAPIST

## 2021-03-15 PROCEDURE — 97161 PT EVAL LOW COMPLEX 20 MIN: CPT | Performed by: PHYSICAL THERAPIST
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
## 2021-03-22 ENCOUNTER — TREATMENT (OUTPATIENT)
Dept: PHYSICAL THERAPY | Facility: CLINIC | Age: 21
End: 2021-03-22

## 2021-03-22 DIAGNOSIS — S46.812A STRAIN OF LEFT TRAPEZIUS MUSCLE, INITIAL ENCOUNTER: Primary | ICD-10-CM

## 2021-03-22 PROCEDURE — 97140 MANUAL THERAPY 1/> REGIONS: CPT | Performed by: PHYSICAL THERAPIST

## 2021-03-22 PROCEDURE — 97110 THERAPEUTIC EXERCISES: CPT | Performed by: PHYSICAL THERAPIST

## 2021-03-22 PROCEDURE — 97014 ELECTRIC STIMULATION THERAPY: CPT | Performed by: PHYSICAL THERAPIST
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
## 2021-04-05 ENCOUNTER — TREATMENT (OUTPATIENT)
Dept: PHYSICAL THERAPY | Facility: CLINIC | Age: 21
End: 2021-04-05

## 2021-04-05 DIAGNOSIS — M54.2 CERVICALGIA: ICD-10-CM

## 2021-04-05 DIAGNOSIS — S46.812A STRAIN OF LEFT TRAPEZIUS MUSCLE, INITIAL ENCOUNTER: Primary | ICD-10-CM

## 2021-04-05 PROCEDURE — 97140 MANUAL THERAPY 1/> REGIONS: CPT | Performed by: PHYSICAL THERAPIST

## 2021-04-05 PROCEDURE — 97110 THERAPEUTIC EXERCISES: CPT | Performed by: PHYSICAL THERAPIST

## 2021-04-05 PROCEDURE — 97014 ELECTRIC STIMULATION THERAPY: CPT | Performed by: PHYSICAL THERAPIST

## 2021-04-05 NOTE — PROGRESS NOTES
"Physical Therapy Daily Progress Note      Patient: Kelsi Holland   : 2000  Diagnosis/ICD-10 Code:  Strain of left trapezius muscle, initial encounter [S46.812A]   Problems Addressed this Visit        Musculoskeletal and Injuries    Strain of left trapezius muscle - Primary      Other Visit Diagnoses     Cervicalgia          Diagnoses       Codes Comments    Strain of left trapezius muscle, initial encounter    -  Primary ICD-10-CM: S46.812A  ICD-9-CM: 840.8     Cervicalgia     ICD-10-CM: M54.2  ICD-9-CM: 723.1          Referring practitioner: Richie Davis Sr., MD  Date of Initial Visit: Type: THERAPY  Noted: 3/15/2021  Today's Date: 2021    VISIT#: 3    Subjective Pt stated she has not had a headache today.  Interested in trying dry needling to help with stiffness/soreness.  Does still have inc pain when looking down for long periods at work.  Did well last visit.  Frustrated with insurance and difficulty getting approval for treatment of her neck/shoulder pain.        Objective   DRY NEEDLING  subocciptials x 2 e B 1\"  Spinal accessory B (UT) 2\"    See Exercise, Manual, and Modality Logs for complete treatment.     Assessment/Plan Pt tolerated tx well.  Good initial glendy to dry needling.      Progress per Plan of Care         Timed:         Manual Therapy:    15     mins  88932;     Therapeutic Exercise:    10     mins  59329;     Neuromuscular Carolina:        mins  01557;    Therapeutic Activity:          mins  69260;     Gait Training:           mins  15441;     Ultrasound:          mins  91742;    Ionto                                   mins   26957  Self Care                            mins   84326  Canalith Repos                   mins  04815    Un-Timed:  Electrical Stimulation:    15     mins  56846 ( );  Dry Needling          mins self-pay  Traction          mins 86214  Low Eval          Mins  74341  Mod Eval          Mins  74270  High Eval                            Mins  85839  Re-Eval        "                        mins  35724    Timed Treatment:   25   mins   Total Treatment:     50   mins    Jocelin Riojas, PT  Physical Therapist

## 2021-04-08 ENCOUNTER — OFFICE VISIT (OUTPATIENT)
Dept: FAMILY MEDICINE CLINIC | Facility: CLINIC | Age: 21
End: 2021-04-08

## 2021-04-08 VITALS
DIASTOLIC BLOOD PRESSURE: 79 MMHG | OXYGEN SATURATION: 97 % | TEMPERATURE: 98.6 F | WEIGHT: 246.8 LBS | HEIGHT: 64 IN | RESPIRATION RATE: 18 BRPM | HEART RATE: 82 BPM | BODY MASS INDEX: 42.14 KG/M2 | SYSTOLIC BLOOD PRESSURE: 125 MMHG

## 2021-04-08 DIAGNOSIS — E66.01 MORBID (SEVERE) OBESITY DUE TO EXCESS CALORIES (HCC): ICD-10-CM

## 2021-04-08 DIAGNOSIS — Z33.1 INCIDENTAL PREGNANCY: ICD-10-CM

## 2021-04-08 DIAGNOSIS — M25.561 ACUTE PAIN OF RIGHT KNEE: Primary | ICD-10-CM

## 2021-04-08 PROCEDURE — 99214 OFFICE O/P EST MOD 30 MIN: CPT | Performed by: FAMILY MEDICINE

## 2021-04-08 NOTE — PROGRESS NOTES
"Chief Complaint  Knee Pain    Subjective    History of Present Illness        Kelsi Holland presents to [facility] for         Knee Pain   The incident occurred more than 1 week ago. There was no injury mechanism. The pain is present in the left knee. The quality of the pain is described as aching, shooting and cramping. The pain is at a severity of 5/10. The pain is mild. The pain has been intermittent since onset. Associated symptoms include an inability to bear weight and muscle weakness. Pertinent negatives include no loss of motion, loss of sensation, numbness or tingling. Associated symptoms comments: The patient is here today and she states that she has been having some knee pain since the last time she was seen for her weight. She states that she has since found out she is pregnant but she states that there was not specific injury to her knee but it has been bothering her really bad and she states that it does not hurt if she presses on it but she states that when she is walking she can feel it. She states that there has been no specific injury to her knee that she can recall.. She reports no foreign bodies present. The symptoms are aggravated by movement and weight bearing. She has tried nothing for the symptoms. The treatment provided no relief.        Objective   Vital Signs:   Visit Vitals  /79   Pulse 82   Temp 98.6 °F (37 °C)   Resp 18   Ht 162.6 cm (64\")   Wt 112 kg (246 lb 12.8 oz)   SpO2 97%   BMI 42.36 kg/m²       Physical Exam  Vitals reviewed.   Constitutional:       General: She is not in acute distress.     Appearance: She is well-developed.   HENT:      Head: Normocephalic.      Right Ear: External ear normal.      Left Ear: External ear normal.   Eyes:      General: No scleral icterus.     Conjunctiva/sclera: Conjunctivae normal.   Cardiovascular:      Rate and Rhythm: Normal rate and regular rhythm.      Heart sounds: Normal heart sounds.   Pulmonary:    "   Effort: Pulmonary effort is normal.      Breath sounds: Normal breath sounds.   Musculoskeletal:      Cervical back: Normal range of motion.      Right knee: Tenderness present over the medial joint line.            Result Review :                    Assessment and Plan      Diagnoses and all orders for this visit:    1. Acute pain of right knee (Primary)  Assessment & Plan:  Unknown etiology of pain.  Patient was advised to take Tylenol and ice.  No further treatment rescanned at this time due to pregnancy.      2. Morbid (severe) obesity due to excess calories (CMS/[facility])  Assessment & Plan:  Patient's (Body mass index is 42.36 kg/m².) indicates that they are morbidly obese (BMI > 40 or > 35 with obesity - related health condition) with obesity-related health conditions that include none . Obesity is improving with treatment. BMI is is above average; BMI management plan is completed. We discussed portion control and increasing exercise.       3. Incidental pregnancy  Assessment & Plan:  Treatment is limited due to pregnancy.  Patient is to follow-up with OB/GYN.             Follow Up   No follow-ups on file.  Patient was given instructions and counseling regarding her condition or for health maintenance advice. Please see specific information pulled into the AVS if appropriate.

## 2021-04-09 ENCOUNTER — TREATMENT (OUTPATIENT)
Dept: PHYSICAL THERAPY | Facility: CLINIC | Age: 21
End: 2021-04-09

## 2021-04-09 DIAGNOSIS — S46.812A STRAIN OF LEFT TRAPEZIUS MUSCLE, INITIAL ENCOUNTER: Primary | ICD-10-CM

## 2021-04-09 DIAGNOSIS — M54.2 CERVICALGIA: ICD-10-CM

## 2021-04-09 PROBLEM — M25.561 ACUTE PAIN OF RIGHT KNEE: Status: ACTIVE | Noted: 2021-04-09

## 2021-04-09 PROBLEM — Z33.1 INCIDENTAL PREGNANCY: Status: ACTIVE | Noted: 2021-04-09

## 2021-04-09 PROCEDURE — 97110 THERAPEUTIC EXERCISES: CPT | Performed by: PHYSICAL THERAPIST

## 2021-04-09 PROCEDURE — 97014 ELECTRIC STIMULATION THERAPY: CPT | Performed by: PHYSICAL THERAPIST

## 2021-04-09 PROCEDURE — 97140 MANUAL THERAPY 1/> REGIONS: CPT | Performed by: PHYSICAL THERAPIST

## 2021-04-09 NOTE — PROGRESS NOTES
Physical Therapy Daily Progress Note      Patient: Kelsi Holland   : 2000  Diagnosis/ICD-10 Code:  Strain of left trapezius muscle, initial encounter [S46.812A]   Problems Addressed this Visit        Musculoskeletal and Injuries    Strain of left trapezius muscle - Primary      Other Visit Diagnoses     Cervicalgia          Diagnoses       Codes Comments    Strain of left trapezius muscle, initial encounter    -  Primary ICD-10-CM: S46.812A  ICD-9-CM: 840.8     Cervicalgia     ICD-10-CM: M54.2  ICD-9-CM: 723.1          Referring practitioner: Richie Davis Sr., MD  Date of Initial Visit: Type: THERAPY  Noted: 3/15/2021  Today's Date: 2021    VISIT#: 4    Subjective Pt stated she did have a headache after leaving last visit.  Doing better now.  Overall pleased with progress and continues to see gradual improvement.      Objective Began with manual stretching/STM.  Completed exercise with focus on postural strengthening.  Finished with estim/heat.      See Exercise, Manual, and Modality Logs for complete treatment.     Assessment/Plan Improving pain.      Progress per Plan of Care         Timed:         Manual Therapy:    15     mins  92735;     Therapeutic Exercise:    10     mins  49367;     Neuromuscular Carolina:        mins  36000;    Therapeutic Activity:          mins  81883;     Gait Training:           mins  84036;     Ultrasound:          mins  26726;    Ionto                                   mins   05794  Self Care                            mins   85506  Canalith Repos                   mins  25227    Un-Timed:  Electrical Stimulation:    15     mins  51021 ( );  Dry Needling          mins self-pay  Traction          mins 47030  Low Eval          Mins  48358  Mod Eval          Mins  74826  High Eval                            Mins  32489  Re-Eval                               mins  43203    Timed Treatment:   25   mins   Total Treatment:     40   mins    Jocelin Riojas PT  Physical  Therapist

## 2021-04-09 NOTE — ASSESSMENT & PLAN NOTE
Patient's (Body mass index is 42.36 kg/m².) indicates that they are morbidly obese (BMI > 40 or > 35 with obesity - related health condition) with obesity-related health conditions that include none . Obesity is improving with treatment. BMI is is above average; BMI management plan is completed. We discussed portion control and increasing exercise.

## 2021-04-09 NOTE — ASSESSMENT & PLAN NOTE
Unknown etiology of pain.  Patient was advised to take Tylenol and ice.  No further treatment rescanned at this time due to pregnancy.

## 2021-04-14 ENCOUNTER — TREATMENT (OUTPATIENT)
Dept: PHYSICAL THERAPY | Facility: CLINIC | Age: 21
End: 2021-04-14

## 2021-04-14 DIAGNOSIS — M54.2 CERVICALGIA: ICD-10-CM

## 2021-04-14 DIAGNOSIS — S46.812A STRAIN OF LEFT TRAPEZIUS MUSCLE, INITIAL ENCOUNTER: Primary | ICD-10-CM

## 2021-04-14 LAB
EXTERNAL GROUP B STREP ANTIGEN: NEGATIVE
EXTERNAL HEPATITIS B SURFACE ANTIGEN: NEGATIVE
EXTERNAL HEPATITIS C AB: NEGATIVE
EXTERNAL RUBELLA QUALITATIVE: NORMAL
EXTERNAL SYPHILIS RPR SCREEN: NORMAL
HIV1 P24 AG SERPL QL IA: NORMAL

## 2021-04-14 PROCEDURE — 97140 MANUAL THERAPY 1/> REGIONS: CPT | Performed by: PHYSICAL THERAPIST

## 2021-04-14 PROCEDURE — 97014 ELECTRIC STIMULATION THERAPY: CPT | Performed by: PHYSICAL THERAPIST

## 2021-04-14 PROCEDURE — 97110 THERAPEUTIC EXERCISES: CPT | Performed by: PHYSICAL THERAPIST

## 2021-04-14 NOTE — PROGRESS NOTES
Physical Therapy Daily Progress Note      Patient: Kelsi Holland   : 2000  Diagnosis/ICD-10 Code:  Strain of left trapezius muscle, initial encounter [S46.812A]   Problems Addressed this Visit        Musculoskeletal and Injuries    Strain of left trapezius muscle - Primary      Other Visit Diagnoses     Cervicalgia          Diagnoses       Codes Comments    Strain of left trapezius muscle, initial encounter    -  Primary ICD-10-CM: S46.812A  ICD-9-CM: 840.8     Cervicalgia     ICD-10-CM: M54.2  ICD-9-CM: 723.1          Referring practitioner: Richie Davis Sr., MD  Date of Initial Visit: Type: THERAPY  Noted: 3/15/2021  Today's Date: 2021    VISIT#: 5    Subjective Questionnaire: NDI:    Subjective Pt stated she feels about 80-90% of normal.  Pleased with progress.  Pain/tightness mostly L side neck.     Objective Began with manual stretching/STM.  Finished with estim but with pads a little higher into facet region of neck.      See Exercise, Manual, and Modality Logs for complete treatment.     Assessment/Plan Felt good but slight soreness after estim.  May move electrodes back down into UT muscle belly region next visit or hold if needed.  One more visit then will be awaiting authorization - filled out paperwork this visit.    Goals  Plan Goals: STG  Pt I with HEP in 2 weeks. MET  To dem cervical AROM WFL with no inc pain in 2-3 weeks. PARTIALLY MET  To decrease headache severity without use of medication to 1-2/10 in 2-3 weeks.  MET  LTG  To tolerate sleep with no inc pain/headache symptoms in 4-6 weeks. MET  To dec pain in neck/UE 0-1/10 max in 4-6 weeks. PARTIALLY MET  To tolerate ADLs and work activities with no inc pain in 4-6 weeks.  PARTIALLY MET  To report no headaches in 4-6 weeks.  MET    Progress per Plan of Care         Timed:         Manual Therapy:    15     mins  97458;     Therapeutic Exercise:    10     mins  26917;     Neuromuscular Carolina:        mins  11220;    Therapeutic Activity:           mins  74833;     Gait Training:           mins  69299;     Ultrasound:          mins  21679;    Ionto                                   mins   48170  Self Care                            mins   64203  Canalith Repos                   mins  09803    Un-Timed:  Electrical Stimulation:    15     mins  53303 ( );  Dry Needling          mins self-pay  Traction          mins 91042  Low Eval          Mins  67083  Mod Eval          Mins  54241  High Eval                            Mins  15066  Re-Eval                               mins  61566    Timed Treatment:   25   mins   Total Treatment:     40   mins    Jocelin iRojas PT  Physical Therapist

## 2021-04-20 ENCOUNTER — TREATMENT (OUTPATIENT)
Dept: PHYSICAL THERAPY | Facility: CLINIC | Age: 21
End: 2021-04-20

## 2021-04-20 DIAGNOSIS — S46.812A STRAIN OF LEFT TRAPEZIUS MUSCLE, INITIAL ENCOUNTER: Primary | ICD-10-CM

## 2021-04-20 DIAGNOSIS — M54.2 CERVICALGIA: ICD-10-CM

## 2021-04-20 PROCEDURE — 97140 MANUAL THERAPY 1/> REGIONS: CPT | Performed by: PHYSICAL THERAPIST

## 2021-04-20 PROCEDURE — 97110 THERAPEUTIC EXERCISES: CPT | Performed by: PHYSICAL THERAPIST

## 2021-04-20 NOTE — PROGRESS NOTES
Physical Therapy Daily Progress Note      Patient: Kelsi Holland   : 2000  Diagnosis/ICD-10 Code:  Strain of left trapezius muscle, initial encounter [S46.812A]   Problems Addressed this Visit        Musculoskeletal and Injuries    Strain of left trapezius muscle - Primary      Other Visit Diagnoses     Cervicalgia          Diagnoses       Codes Comments    Strain of left trapezius muscle, initial encounter    -  Primary ICD-10-CM: S46.812A  ICD-9-CM: 840.8     Cervicalgia     ICD-10-CM: M54.2  ICD-9-CM: 723.1          Referring practitioner: Richie Davis Sr., MD  Date of Initial Visit: Type: THERAPY  Noted: 3/15/2021  Today's Date: 2021    VISIT#: 6    Subjective Pt stated that she is feeling much better.  Work is going ok.  Noted that she is pregnant and will be going to get an US next week to date pregnancy.      Objective Completed manual stretching and gentle exercise.  Finished with heat.  Held estim this visit.      See Exercise, Manual, and Modality Logs for complete treatment.     Assessment/Plan Awaiting authorization.      Progress per Plan of Care         Timed:         Manual Therapy:    15     mins  91482;     Therapeutic Exercise:    15     mins  47333;     Neuromuscular [place]:        mins  22306;    Therapeutic Activity:          mins  16224;     Gait Training:           mins  47233;     Ultrasound:          mins  90051;    Ionto                                   mins   37481  Self Care                            mins   73184  Canalith Repos                   mins  62020    Un-Timed:  Electrical Stimulation:         mins  26871 ( );  Dry Needling          mins self-pay  Traction          mins 11232  Low Eval          Mins  17130  Mod Eval          Mins  36738  High Eval                            Mins  82556  Re-Eval                               mins  31435    Timed Treatment:   30   mins   Total Treatment:     30   mins    Jocelin Riojas, PT  Physical Therapist

## 2021-04-23 ENCOUNTER — TREATMENT (OUTPATIENT)
Dept: PHYSICAL THERAPY | Facility: CLINIC | Age: 21
End: 2021-04-23

## 2021-04-23 DIAGNOSIS — S46.812A STRAIN OF LEFT TRAPEZIUS MUSCLE, INITIAL ENCOUNTER: Primary | ICD-10-CM

## 2021-04-23 DIAGNOSIS — M54.2 CERVICALGIA: ICD-10-CM

## 2021-04-23 PROCEDURE — 97140 MANUAL THERAPY 1/> REGIONS: CPT | Performed by: PHYSICAL THERAPIST

## 2021-04-23 PROCEDURE — 97110 THERAPEUTIC EXERCISES: CPT | Performed by: PHYSICAL THERAPIST

## 2021-04-23 NOTE — PROGRESS NOTES
Physical Therapy Daily Progress Note/ DISCHARGE      Patient: Kelsi Holland   : 2000  Diagnosis/ICD-10 Code:  Strain of left trapezius muscle, initial encounter [S46.812A]   Problems Addressed this Visit        Musculoskeletal and Injuries    Strain of left trapezius muscle - Primary      Other Visit Diagnoses     Cervicalgia          Diagnoses       Codes Comments    Strain of left trapezius muscle, initial encounter    -  Primary ICD-10-CM: S46.812A  ICD-9-CM: 840.8     Cervicalgia     ICD-10-CM: M54.2  ICD-9-CM: 723.1          Referring practitioner: Richie Davis Sr., MD  Date of Initial Visit: Type: THERAPY  Noted: 3/15/2021  Today's Date: 2021    VISIT#: 7    Subjective Neck is doing great.  Feels like it is back to normal.  Has to work this weekend.  Thinks that the needling we did seemed to really help get her back to feeling herself.      Objective Began with manual tx then gentle exercise.  Finished with heat.      See Exercise, Manual, and Modality Logs for complete treatment.     Assessment/Plan Pt dem great progress toward goals.  Painfree.      Goals  Plan Goals: STG  Pt I with HEP in 2 weeks. MET  To dem cervical AROM WFL with no inc pain in 2-3 weeks. MET  To decrease headache severity without use of medication to 1-2/10 in 2-3 weeks.  MET  LTG  To tolerate sleep with no inc pain/headache symptoms in 4-6 weeks. MET  To dec pain in neck/UE 0-1/10 max in 4-6 weeks. MET  To tolerate ADLs and work activities with no inc pain in 4-6 weeks.  MET  To report no headaches in 4-6 weeks.  MET    Plan to DC with HEP         Timed:         Manual Therapy:    15     mins  20836;     Therapeutic Exercise:    15     mins  88829;     Neuromuscular Carolina:        mins  10003;    Therapeutic Activity:          mins  46421;     Gait Training:           mins  63303;     Ultrasound:          mins  49116;    Ionto                                   mins   99237  Self Care                            mins    15006  Canalith Repos                   mins  39130    Un-Timed:  Electrical Stimulation:         mins  99430 ( );  Dry Needling          mins self-pay  Traction          mins 42369  Low Eval          Mins  81679  Mod Eval          Mins  33060  High Eval                            Mins  45241  Re-Eval                               mins  89230    Timed Treatment:   30   mins   Total Treatment:     40   mins    Jocelin Riojas, PT  Physical Therapist

## 2021-06-30 ENCOUNTER — OFFICE VISIT (OUTPATIENT)
Dept: FAMILY MEDICINE CLINIC | Facility: CLINIC | Age: 21
End: 2021-06-30

## 2021-06-30 VITALS
OXYGEN SATURATION: 98 % | BODY MASS INDEX: 42.47 KG/M2 | HEIGHT: 64 IN | WEIGHT: 248.8 LBS | TEMPERATURE: 97.5 F | DIASTOLIC BLOOD PRESSURE: 70 MMHG | SYSTOLIC BLOOD PRESSURE: 128 MMHG | HEART RATE: 112 BPM

## 2021-06-30 DIAGNOSIS — Z3A.18 18 WEEKS GESTATION OF PREGNANCY: ICD-10-CM

## 2021-06-30 DIAGNOSIS — J45.21 MILD INTERMITTENT ASTHMA WITH ACUTE EXACERBATION: ICD-10-CM
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
## 2021-06-30 DIAGNOSIS — E66.01 MORBID (SEVERE) OBESITY DUE TO EXCESS CALORIES (HCC): ICD-10-CM

## 2021-06-30 PROBLEM — IMO0002 BODY MASS INDEX 95-99% FOR AGE, OBESE CHILD WEIGHT MANAGE/MULTIDISCIPL: Status: RESOLVED | Noted: 2020-11-03 | Resolved: 2021-06-30

## 2021-06-30 PROBLEM — L03.113 CELLULITIS OF RIGHT UPPER EXTREMITY: Status: RESOLVED | Noted: 2020-11-03 | Resolved: 2021-06-30

## 2021-06-30 PROBLEM — E66.9 BODY MASS INDEX 95-99% FOR AGE, OBESE CHILD WEIGHT MANAGE/MULTIDISCIPL: Status: RESOLVED | Noted: 2020-11-03 | Resolved: 2021-06-30

## 2021-06-30 PROBLEM — T30.0 FRICTION BURN: Status: RESOLVED | Noted: 2021-02-05 | Resolved: 2021-06-30

## 2021-06-30 PROCEDURE — 99213 OFFICE O/P EST LOW 20 MIN: CPT | Performed by: FAMILY MEDICINE

## 2021-06-30 RX ORDER — FAMOTIDINE 20 MG/1
20 TABLET, FILM COATED ORAL NIGHTLY
Qty: 30 TABLET | Refills: 0 | Status: ON HOLD | OUTPATIENT
Start: 2021-06-30 | End: 2021-10-18

## 2021-06-30 RX ORDER — ALBUTEROL SULFATE 90 UG/1
2 AEROSOL, METERED RESPIRATORY (INHALATION) EVERY 4 HOURS PRN
Qty: 18 G | Refills: 0 | Status: SHIPPED | OUTPATIENT
Start: 2021-06-30 | End: 2022-03-30 | Stop reason: SDUPTHER

## 2021-06-30 RX ORDER — TRIAMCINOLONE ACETONIDE 1 MG/G
CREAM TOPICAL 2 TIMES DAILY
Qty: 30 G | Refills: 0 | Status: ON HOLD | OUTPATIENT
Start: 2021-06-30 | End: 2021-10-18

## 2021-06-30 RX ORDER — LORATADINE 10 MG/1
10 TABLET ORAL DAILY
Qty: 30 TABLET | Refills: 0 | Status: SHIPPED | OUTPATIENT
Start: 2021-06-30 | End: 2021-07-24
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
## 2021-07-21 ENCOUNTER — PATIENT MESSAGE (OUTPATIENT)
Dept: FAMILY MEDICINE CLINIC | Facility: CLINIC | Age: 21
End: 2021-07-21

## 2021-07-21 DIAGNOSIS — L55.1 SUNBURN, SECOND DEGREE: ICD-10-CM

## 2021-07-21 DIAGNOSIS — L55.9 SUNBURN: Primary | ICD-10-CM

## 2021-07-22 NOTE — TELEPHONE ENCOUNTER
From: Kelsi Holland  To: Richie Davis Sr, MD  Sent: 7/21/2021 8:53 PM EDT  Subject: Non-Urgent Medical Question    Hello, I have a pretty bad sunburn that I think is actually considered to be sun poisoning due to the blisters. I was looking to see if there's anything I can get for this. A couple of the big blisters have started to pop but on my shoulder the blisters are just wilfrid getting bigger. I have not been wearing anything on my upper back and shoulders when possible. When I have to wear a shirt it just feels like someone's stabbing me with a bunch of needles. I'm looking to get or find out what I can get to help it heal faster or at least help the pain (I have had consistent aloe up to this point). Thanks
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
## 2021-07-24 RX ORDER — LORATADINE 10 MG/1
TABLET ORAL
Qty: 30 TABLET | Refills: 0 | Status: SHIPPED | OUTPATIENT
Start: 2021-07-24 | End: 2022-02-18

## 2021-08-24 ENCOUNTER — OFFICE VISIT (OUTPATIENT)
Dept: CARDIOLOGY | Facility: CLINIC | Age: 21
End: 2021-08-24

## 2021-08-24 VITALS
BODY MASS INDEX: 43.54 KG/M2 | OXYGEN SATURATION: 98 % | DIASTOLIC BLOOD PRESSURE: 81 MMHG | SYSTOLIC BLOOD PRESSURE: 118 MMHG | WEIGHT: 255 LBS | HEART RATE: 92 BPM | HEIGHT: 64 IN

## 2021-08-24 DIAGNOSIS — R00.2 PALPITATIONS: Primary | ICD-10-CM

## 2021-08-24 PROCEDURE — 93000 ELECTROCARDIOGRAM COMPLETE: CPT | Performed by: INTERNAL MEDICINE

## 2021-08-24 PROCEDURE — 99203 OFFICE O/P NEW LOW 30 MIN: CPT | Performed by: INTERNAL MEDICINE

## 2021-08-24 NOTE — PROGRESS NOTES
HP      Name: Kelsi Holland ADMIT: (Not on file)   : 2000  PCP: Richie Davis Sr., MD    MRN: [id number] LOS: 0 days   AGE/SEX: 21 y.o. female  ROOM: Room/bed info not found     Chief Complaint   Patient presents with   • Consult   • Palpitations       Subjective         History of present illness  Ms. patient is a 21-year-old female patient who is currently 24 weeks pregnant presents here today for evaluation of palpitations.  Patient has had palpitations for many years but they have gotten worse since she has gotten pregnant.  Furthermore she is experiencing significant amount of stress due to family related issues.  Palpitations last anywhere from 15 minutes to several hours and are often perceived as skipped beats.  Patient denies any chest pain or shortness of breath and no syncopal episodes.  Past Medical History:   Diagnosis Date   • Allergic    • Asthma      History reviewed. No pertinent surgical history.  Family History   Problem Relation Age of Onset   • Breast cancer Mother      Social History     Tobacco Use   • Smoking status: Never Smoker   • Smokeless tobacco: Never Used   Vaping Use   • Vaping Use: Never used   Substance Use Topics   • Alcohol use: Never   • Drug use: Never     (Not in a hospital admission)    Allergies:  Adhesive tape        Physical Exam  VITALS REVIEWED    General:      well developed, in no acute distress.    Head:      normocephalic and atraumatic.    Eyes:      PERRL/EOM intact, conjunctiva and sclera clear with out nystagmus.    Neck:      no masses, thyromegaly,  trachea central with normal respiratory effort and PMI displaced laterally  Lungs:      Patient auscultation bilaterally  Heart:       Regular rate and rhythm no murmur rubs or gallops  Msk:      no deformity or scoliosis noted of thoracic or lumbar spine.    Pulses:      pulses normal in all 4 extremities.    Extremities:       1+ edema  Neurologic:      no focal deficits.   alert oriented x3  Skin:       intact without lesions or rashes.    Psych:      alert and cooperative; normal mood and affect; normal attention span and concentration.      Result Review :                  ECG 12 Lead    Date/Time: 8/24/2021 1:39 PM  Performed by: Lissette Cruz MD  Authorized by: Lissette Cruz MD   Comparison: compared with previous ECG   Rhythm: sinus rhythm  Rate: normal  Conduction: conduction normal  ST Segments: ST segments normal  QRS axis: normal  Other findings: non-specific ST-T wave changes                Assessment and Plan      MsMaxime patient is a 21-year-old female patient who is here today for palpitations her EKG today shows sinus rhythm with no signs of WPW or QT prolongation.  Her palpitations seem to be related to PVCs and are often exacerbated with stress.  I will order a 2-week Holter monitor and will see her in follow-up after that.  If no arrhythmias are recorded on the monitor while she is having symptoms then her symptoms are most likely noncardiac.    Diagnoses and all orders for this visit:    1. Palpitations (Primary)  -     Holter Monitor - 72 Hour Up To 15 Days; Future           Return in about 3 years (around 8/24/2024), or post monitor.  Patient was given instructions and counseling regarding her condition or for health maintenance advice. Please see specific information pulled into the AVS if appropriate.

## 2021-08-27 ENCOUNTER — PATIENT ROUNDING (BHMG ONLY) (OUTPATIENT)
Dept: CARDIOLOGY | Facility: CLINIC | Age: 21
End: 2021-08-27

## 2021-09-09 ENCOUNTER — TELEPHONE (OUTPATIENT)
Dept: CARDIOLOGY | Facility: CLINIC | Age: 21
End: 2021-09-09

## 2021-09-16 ENCOUNTER — TELEPHONE (OUTPATIENT)
Dept: CARDIOLOGY | Facility: CLINIC | Age: 21
End: 2021-09-16

## 2021-09-16 NOTE — TELEPHONE ENCOUNTER
Patient supposed to wear Holter for 14 days. She had it put on 9/13/21. She had to take it off yesterday due to skin irration and has not put it back on yet. She is asking if she can just put in on when she has palpitations?

## 2021-09-16 NOTE — TELEPHONE ENCOUNTER
Called patient. She states that she is going to try to clean the area off really well and wipe it with some alcohol and try wearing it again. If it still bothers her then she will come  some supplies to wear the holter in an alternate way.

## 2021-09-23 NOTE — TELEPHONE ENCOUNTER
Pt stopped by office with the holter to return it. Pt wore it 10 days off and on out of the 14 days.    I was going to put a different holter on but the pt chest was red with blisters and she said it hood.      I informed Alicia of the situation.     Completed

## 2021-09-27 ENCOUNTER — TELEPHONE (OUTPATIENT)
Dept: CARDIOLOGY | Facility: CLINIC | Age: 21
End: 2021-09-27

## 2021-09-29 ENCOUNTER — TELEPHONE (OUTPATIENT)
Dept: CARDIOLOGY | Facility: CLINIC | Age: 21
End: 2021-09-29

## 2021-10-18 ENCOUNTER — HOSPITAL ENCOUNTER (OUTPATIENT)
Facility: HOSPITAL | Age: 21
Discharge: HOME | End: 2021-10-18
Attending: OBSTETRICS & GYNECOLOGY | Admitting: OBSTETRICS & GYNECOLOGY

## 2021-10-18 VITALS
OXYGEN SATURATION: 98 % | HEIGHT: 63 IN | DIASTOLIC BLOOD PRESSURE: 76 MMHG | HEART RATE: 79 BPM | WEIGHT: 277.34 LBS | SYSTOLIC BLOOD PRESSURE: 133 MMHG | RESPIRATION RATE: 20 BRPM | BODY MASS INDEX: 49.14 KG/M2 | TEMPERATURE: 99.2 F

## 2021-10-18 PROBLEM — O16.3 ELEVATED BLOOD PRESSURE AFFECTING PREGNANCY IN THIRD TRIMESTER, ANTEPARTUM: Status: ACTIVE | Noted: 2021-10-18

## 2021-10-18 LAB
ALBUMIN SERPL-MCNC: 2.8 G/DL (ref 3.5–5.2)
ALBUMIN/GLOB SERPL: 1 G/DL
ALP SERPL-CCNC: 118 U/L (ref 39–117)
ALT SERPL W P-5'-P-CCNC: 7 U/L (ref 1–33)
ANION GAP SERPL CALCULATED.3IONS-SCNC: 12 MMOL/L (ref 5–15)
AST SERPL-CCNC: 11 U/L (ref 1–32)
BILIRUB SERPL-MCNC: <0.2 MG/DL (ref 0–1.2)
BUN SERPL-MCNC: 7 MG/DL (ref 6–20)
BUN/CREAT SERPL: 12.1 (ref 7–25)
CALCIUM SPEC-SCNC: 8.8 MG/DL (ref 8.6–10.5)
CHLORIDE SERPL-SCNC: 105 MMOL/L (ref 98–107)
CO2 SERPL-SCNC: 21 MMOL/L (ref 22–29)
CREAT SERPL-MCNC: 0.58 MG/DL (ref 0.57–1)
DEPRECATED RDW RBC AUTO: 42.9 FL (ref 37–54)
ERYTHROCYTE [DISTWIDTH] IN BLOOD BY AUTOMATED COUNT: 14.8 % (ref 12.3–15.4)
GFR SERPL CREATININE-BSD FRML MDRD: 129 ML/MIN/1.73
GLOBULIN UR ELPH-MCNC: 2.8 GM/DL
GLUCOSE SERPL-MCNC: 100 MG/DL (ref 65–99)
HCT VFR BLD AUTO: 36.2 % (ref 34–46.6)
HGB BLD-MCNC: 12.1 G/DL (ref 12–15.9)
MCH RBC QN AUTO: 27.7 PG (ref 26.6–33)
MCHC RBC AUTO-ENTMCNC: 33.6 G/DL (ref 31.5–35.7)
MCV RBC AUTO: 82.4 FL (ref 79–97)
PLATELET # BLD AUTO: 250 10*3/MM3 (ref 140–450)
PMV BLD AUTO: 8 FL (ref 6–12)
POTASSIUM SERPL-SCNC: 3.8 MMOL/L (ref 3.5–5.2)
PROT SERPL-MCNC: 5.6 G/DL (ref 6–8.5)
RBC # BLD AUTO: 4.39 10*6/MM3 (ref 3.77–5.28)
SODIUM SERPL-SCNC: 138 MMOL/L (ref 136–145)
WBC # BLD AUTO: 7.6 10*3/MM3 (ref 3.4–10.8)

## 2021-10-18 PROCEDURE — 85027 COMPLETE CBC AUTOMATED: CPT | Performed by: OBSTETRICS & GYNECOLOGY

## 2021-10-18 PROCEDURE — G0463 HOSPITAL OUTPT CLINIC VISIT: HCPCS

## 2021-10-18 PROCEDURE — 80053 COMPREHEN METABOLIC PANEL: CPT | Performed by: OBSTETRICS & GYNECOLOGY

## 2021-10-18 RX ORDER — LABETALOL 100 MG/1
50 TABLET, FILM COATED ORAL 2 TIMES DAILY PRN
COMMUNITY

## 2021-10-18 NOTE — DISCHARGE INSTR - LAB
12 hour urine for total protein to be completed on 10/19/2021 at 01:35 am. Keep on ice until taken to lab in AM 10/19/2021

## 2021-10-18 NOTE — NURSING NOTE
Discharge instructions given and explained. Discussed warning s/s of pre-eclampsia, when to call md. Pt to call office In am for f/u appointment. 12hr urine supplies sent with urine, to keep cold. Pt verbalized understanding. Pt left per ambulatory to home with family to home.

## 2021-10-19 ENCOUNTER — LAB (OUTPATIENT)
Dept: LAB | Facility: HOSPITAL | Age: 21
End: 2021-10-19

## 2021-10-19 ENCOUNTER — TRANSCRIBE ORDERS (OUTPATIENT)
Dept: ADMINISTRATIVE | Facility: HOSPITAL | Age: 21
End: 2021-10-19

## 2021-10-19 ENCOUNTER — HOSPITAL ENCOUNTER (OUTPATIENT)
Facility: HOSPITAL | Age: 21
Discharge: HOME | End: 2021-10-19
Attending: OBSTETRICS & GYNECOLOGY | Admitting: OBSTETRICS & GYNECOLOGY

## 2021-10-19 VITALS
DIASTOLIC BLOOD PRESSURE: 85 MMHG | HEART RATE: 86 BPM | WEIGHT: 274.69 LBS | TEMPERATURE: 98.5 F | SYSTOLIC BLOOD PRESSURE: 144 MMHG | HEIGHT: 63 IN | BODY MASS INDEX: 48.67 KG/M2 | RESPIRATION RATE: 18 BRPM | OXYGEN SATURATION: 98 %

## 2021-10-19 DIAGNOSIS — O13.9 GESTATIONAL HYPERTENSION, UNSPECIFIED TRIMESTER: Primary | ICD-10-CM

## 2021-10-19 DIAGNOSIS — O13.9 GESTATIONAL HYPERTENSION, UNSPECIFIED TRIMESTER: ICD-10-CM

## 2021-10-19 PROBLEM — Z34.90 PREGNANT: Status: ACTIVE | Noted: 2021-10-19

## 2021-10-19 LAB
ALBUMIN SERPL-MCNC: 3 G/DL (ref 3.5–5.2)
ALBUMIN/GLOB SERPL: 1.1 G/DL
ALP SERPL-CCNC: 127 U/L (ref 39–117)
ALT SERPL W P-5'-P-CCNC: 6 U/L (ref 1–33)
ANION GAP SERPL CALCULATED.3IONS-SCNC: 13 MMOL/L (ref 5–15)
AST SERPL-CCNC: 11 U/L (ref 1–32)
BILIRUB SERPL-MCNC: <0.2 MG/DL (ref 0–1.2)
BUN SERPL-MCNC: 7 MG/DL (ref 6–20)
BUN/CREAT SERPL: 14 (ref 7–25)
CALCIUM SPEC-SCNC: 8.9 MG/DL (ref 8.6–10.5)
CHLORIDE SERPL-SCNC: 108 MMOL/L (ref 98–107)
CO2 SERPL-SCNC: 19 MMOL/L (ref 22–29)
CREAT SERPL-MCNC: 0.5 MG/DL (ref 0.57–1)
DEPRECATED RDW RBC AUTO: 42.4 FL (ref 37–54)
ERYTHROCYTE [DISTWIDTH] IN BLOOD BY AUTOMATED COUNT: 15 % (ref 12.3–15.4)
GFR SERPL CREATININE-BSD FRML MDRD: >150 ML/MIN/1.73
GLOBULIN UR ELPH-MCNC: 2.8 GM/DL
GLUCOSE SERPL-MCNC: 73 MG/DL (ref 65–99)
HCT VFR BLD AUTO: 36.4 % (ref 34–46.6)
HGB BLD-MCNC: 12.3 G/DL (ref 12–15.9)
MCH RBC QN AUTO: 26.9 PG (ref 26.6–33)
MCHC RBC AUTO-ENTMCNC: 33.8 G/DL (ref 31.5–35.7)
MCV RBC AUTO: 79.7 FL (ref 79–97)
PLATELET # BLD AUTO: 265 10*3/MM3 (ref 140–450)
PMV BLD AUTO: 8.1 FL (ref 6–12)
POTASSIUM SERPL-SCNC: 4.2 MMOL/L (ref 3.5–5.2)
PROT 24H UR-MRATE: 82.5 MG/24HOURS (ref 0–150)
PROT SERPL-MCNC: 5.8 G/DL (ref 6–8.5)
RBC # BLD AUTO: 4.56 10*6/MM3 (ref 3.77–5.28)
SODIUM SERPL-SCNC: 140 MMOL/L (ref 136–145)
WBC # BLD AUTO: 8.2 10*3/MM3 (ref 3.4–10.8)

## 2021-10-19 PROCEDURE — 85027 COMPLETE CBC AUTOMATED: CPT | Performed by: OBSTETRICS & GYNECOLOGY

## 2021-10-19 PROCEDURE — G0463 HOSPITAL OUTPT CLINIC VISIT: HCPCS

## 2021-10-19 PROCEDURE — 80053 COMPREHEN METABOLIC PANEL: CPT | Performed by: OBSTETRICS & GYNECOLOGY

## 2021-10-19 PROCEDURE — 81050 URINALYSIS VOLUME MEASURE: CPT

## 2021-10-19 PROCEDURE — 84156 ASSAY OF PROTEIN URINE: CPT

## 2021-10-19 NOTE — PLAN OF CARE
Goal Outcome Evaluation:  Plan of Care Reviewed With: patient        Progress: improving  Outcome Summary: 12h urine and repeat labs WNL.

## 2021-10-24 ENCOUNTER — HOSPITAL ENCOUNTER (OUTPATIENT)
Facility: HOSPITAL | Age: 21
Discharge: HOME | End: 2021-10-25
Attending: OBSTETRICS & GYNECOLOGY | Admitting: OBSTETRICS & GYNECOLOGY

## 2021-10-24 PROBLEM — O16.9 HYPERTENSION IN PREGNANCY: Status: ACTIVE | Noted: 2021-10-24

## 2021-10-24 LAB
ALBUMIN SERPL-MCNC: 3 G/DL (ref 3.5–5.2)
ALBUMIN/GLOB SERPL: 1.1 G/DL
ALP SERPL-CCNC: 119 U/L (ref 39–117)
ALT SERPL W P-5'-P-CCNC: 6 U/L (ref 1–33)
ANION GAP SERPL CALCULATED.3IONS-SCNC: 12 MMOL/L (ref 5–15)
AST SERPL-CCNC: 10 U/L (ref 1–32)
BASOPHILS # BLD AUTO: 0 10*3/MM3 (ref 0–0.2)
BASOPHILS NFR BLD AUTO: 0.2 % (ref 0–1.5)
BILIRUB SERPL-MCNC: <0.2 MG/DL (ref 0–1.2)
BUN SERPL-MCNC: 10 MG/DL (ref 6–20)
BUN/CREAT SERPL: 17.5 (ref 7–25)
CALCIUM SPEC-SCNC: 8.8 MG/DL (ref 8.6–10.5)
CHLORIDE SERPL-SCNC: 106 MMOL/L (ref 98–107)
CO2 SERPL-SCNC: 19 MMOL/L (ref 22–29)
CREAT SERPL-MCNC: 0.57 MG/DL (ref 0.57–1)
DEPRECATED RDW RBC AUTO: 42.4 FL (ref 37–54)
EOSINOPHIL # BLD AUTO: 0 10*3/MM3 (ref 0–0.4)
EOSINOPHIL NFR BLD AUTO: 0.5 % (ref 0.3–6.2)
ERYTHROCYTE [DISTWIDTH] IN BLOOD BY AUTOMATED COUNT: 15.1 % (ref 12.3–15.4)
GFR SERPL CREATININE-BSD FRML MDRD: 131 ML/MIN/1.73
GLOBULIN UR ELPH-MCNC: 2.8 GM/DL
GLUCOSE SERPL-MCNC: 103 MG/DL (ref 65–99)
HCT VFR BLD AUTO: 35 % (ref 34–46.6)
HGB BLD-MCNC: 11.6 G/DL (ref 12–15.9)
LYMPHOCYTES # BLD AUTO: 1.2 10*3/MM3 (ref 0.7–3.1)
LYMPHOCYTES NFR BLD AUTO: 15.7 % (ref 19.6–45.3)
MCH RBC QN AUTO: 26.4 PG (ref 26.6–33)
MCHC RBC AUTO-ENTMCNC: 33.2 G/DL (ref 31.5–35.7)
MCV RBC AUTO: 79.7 FL (ref 79–97)
MONOCYTES # BLD AUTO: 0.4 10*3/MM3 (ref 0.1–0.9)
MONOCYTES NFR BLD AUTO: 5.6 % (ref 5–12)
NEUTROPHILS NFR BLD AUTO: 5.8 10*3/MM3 (ref 1.7–7)
NEUTROPHILS NFR BLD AUTO: 78 % (ref 42.7–76)
NRBC BLD AUTO-RTO: 0 /100 WBC (ref 0–0.2)
PLATELET # BLD AUTO: 264 10*3/MM3 (ref 140–450)
PMV BLD AUTO: 8.1 FL (ref 6–12)
POTASSIUM SERPL-SCNC: 4 MMOL/L (ref 3.5–5.2)
PROT SERPL-MCNC: 5.8 G/DL (ref 6–8.5)
RBC # BLD AUTO: 4.39 10*6/MM3 (ref 3.77–5.28)
SODIUM SERPL-SCNC: 137 MMOL/L (ref 136–145)
WBC # BLD AUTO: 7.5 10*3/MM3 (ref 3.4–10.8)

## 2021-10-24 PROCEDURE — 85025 COMPLETE CBC W/AUTO DIFF WBC: CPT | Performed by: OBSTETRICS & GYNECOLOGY

## 2021-10-24 PROCEDURE — 80053 COMPREHEN METABOLIC PANEL: CPT | Performed by: OBSTETRICS & GYNECOLOGY

## 2021-10-24 RX ORDER — FAMOTIDINE 20 MG/1
20 TABLET, FILM COATED ORAL 2 TIMES DAILY PRN
Status: DISCONTINUED | OUTPATIENT
Start: 2021-10-24 | End: 2021-10-25 | Stop reason: HOSPADM

## 2021-10-24 RX ORDER — ACETAMINOPHEN 325 MG/1
650 TABLET ORAL EVERY 4 HOURS PRN
Status: DISCONTINUED | OUTPATIENT
Start: 2021-10-24 | End: 2021-10-25 | Stop reason: HOSPADM

## 2021-10-24 RX ORDER — SODIUM CHLORIDE 0.9 % (FLUSH) 0.9 %
3-10 SYRINGE (ML) INJECTION AS NEEDED
Status: DISCONTINUED | OUTPATIENT
Start: 2021-10-24 | End: 2021-10-24

## 2021-10-24 RX ORDER — LIDOCAINE HYDROCHLORIDE 10 MG/ML
5 INJECTION, SOLUTION EPIDURAL; INFILTRATION; INTRACAUDAL; PERINEURAL AS NEEDED
Status: DISCONTINUED | OUTPATIENT
Start: 2021-10-24 | End: 2021-10-24

## 2021-10-24 RX ORDER — LABETALOL 100 MG/1
50 TABLET, FILM COATED ORAL 2 TIMES DAILY PRN
Status: CANCELLED | OUTPATIENT
Start: 2021-10-24

## 2021-10-24 RX ORDER — PRENATAL VIT/IRON FUM/FOLIC AC 27MG-0.8MG
1 TABLET ORAL DAILY
Status: DISCONTINUED | OUTPATIENT
Start: 2021-10-24 | End: 2021-10-25 | Stop reason: HOSPADM

## 2021-10-24 RX ORDER — SODIUM CHLORIDE 0.9 % (FLUSH) 0.9 %
3 SYRINGE (ML) INJECTION EVERY 12 HOURS SCHEDULED
Status: DISCONTINUED | OUTPATIENT
Start: 2021-10-24 | End: 2021-10-24

## 2021-10-24 RX ADMIN — ACETAMINOPHEN 650 MG: 325 TABLET, FILM COATED ORAL at 19:07

## 2021-10-24 NOTE — H&P
ANI Solares  Obstetric History and Physical     Chief Complaint: Headache for 3 hours    Subjective     Patient is a 21 y.o. female  currently at 35w2d, who presents with headache for 3 hours and elevated blood pressure.  She checked her blood pressure on Friday it was 170/110 and then it came down an hour later.  She feels her pressures have been increasing lately.  She had some right-sided rib pain earlier that came and went very quickly.  Overall she does not feel well..    Her prenatal care is complicated by  hypertension  Recently, has a history of taking propranolol and now labetalol per cardiology for questionable atrial fibrillation..  Her previous obstetric/gynecological history is noted for is non-contributory.      Prenatal Information:  Prenatal Results     POC Urine Glucose/Protein     Test Value Reference Range Date Time    Urine Glucose        Urine Protein              Initial Prenatal Labs     Test Value Reference Range Date Time    Hemoglobin        Hematocrit        Platelets  265 10*3/mm3 140 - 450 10/19/21 1540       250 10*3/mm3 140 - 450 10/18/21 1532    Rubella IgG        Hepatitis B SAg        Hepatitis C Ab        RPR        ABO        Rh        Antibody Screen        HIV        Urine Culture        Gonorrhea        Chlamydia        TSH  1.890 uIU/mL 0.450 - 4.500 02/10/21 1541    HgB A1c               2nd and 3rd Trimester     Test Value Reference Range Date Time    Hemoglobin (repeated)  12.3 g/dL 12.0 - 15.9 10/19/21 1540       12.1 g/dL 12.0 - 15.9 10/18/21 1532    Hematocrit (repeated)  36.4 % 34.0 - 46.6 10/19/21 1540       36.2 % 34.0 - 46.6 10/18/21 1532    Platelets   265 10*3/mm3 140 - 450 10/19/21 1540       250 10*3/mm3 140 - 450 10/18/21 1532    GCT        Antibody Screen (repeated)        GTT Fasting        GTT 1 Hr        GTT 2 Hr        GTT 3 Hr        Group B Strep              Drug Screening     Test Value Reference Range Date Time    Amphetamine Screen         Barbiturate Screen        Benzodiazepine Screen        Methadone Screen        Phencyclidine Screen        Opiates Screen        THC Screen        Cocaine Screen        Propoxyphene Screen        Buprenorphine Screen        Methamphetamine Screen        Oxycodone Screen        Tricyclic Antidepressants Screen              Other (Risk screening)     Test Value Reference Range Date Time    Varicella IgG        Parvovirus IgG        CMV IgG        Cystic Fibrosis        Hemoglobin electrophoresis        NIPT        MSAFP-4        AFP (for NTD only)              Legend    ^: Historical                      External Prenatal Results     Pregnancy Outside Results - Transcribed From Office Records - See Scanned Records For Details     Test Value Date Time    ABO       Rh       Antibody Screen       Varicella IgG ^ 2.3 AI 11/09/18 1208    Rubella       Hgb  12.3 g/dL 10/19/21 1540       12.1 g/dL 10/18/21 1532    Hct  36.4 % 10/19/21 1540       36.2 % 10/18/21 1532    Glucose Fasting GTT       Glucose Tolerance Test 1 hour       Glucose Tolerance Test 3 hour       Gonorrhea (discrete)       Chlamydia (discrete)       RPR       VDRL       Syphilis Antibody       HBsAg       Herpes Simplex Virus PCR       Herpes Simplex VIrus Culture       HIV       Hep C RNA Quant PCR       Hep C Antibody       AFP       Group B Strep       GBS Susceptibility to Clindamycin       GBS Susceptibility to Erythromycin       Fetal Fibronectin       Genetic Testing, Maternal Blood             Drug Screening     Test Value Date Time    Urine Drug Screen       Amphetamine Screen       Barbiturate Screen       Benzodiazepine Screen       Methadone Screen       Phencyclidine Screen       Opiates Screen       THC Screen       Cocaine Screen       Propoxyphene Screen       Buprenorphine Screen       Methamphetamine Screen       Oxycodone Screen       Tricyclic Antidepressants Screen             Legend    ^: Historical                         Past OB  History: G1       Past Medical History: Past Medical History:   Diagnosis Date   • Allergic    • Asthma    • Palpitations    • PCOS (polycystic ovarian syndrome)         Past Surgical History No past surgical history on file.     Family History: Family History   Problem Relation Age of Onset   • Breast cancer Mother       Social History:  reports that she has never smoked. She has never used smokeless tobacco.   reports no history of alcohol use.   reports no history of drug use.        General ROS: Pertinent items are noted in HPI    Objective      Vitals:     Vitals:    10/24/21 1245   Resp: 18   Temp: 97.9 °F (36.6 °C)   TempSrc: Oral       Fetal Heart Rate Assessment:   Category 1    [place]:   Quiet     Physical Exam:     General Appearance:    Alert, cooperative, in no acute distress   Lungs:     Clear to auscultation,respirations regular.    Heart:    Regular rhythm and normal rate.   Breast Exam:    Deferred   Abdomen:     Normal bowel sounds, no masses, soft non-tender,         non-distended, no guarding, no rebound tenderness   Pelvic Exam:        Presentation:     Cervix:    Extremities:   Moves all extremities well, no edema, no cyanosis, no              redness, normal DTRs negative clonus   Skin:   No bleeding, bruising or rash   Neurologic:   No focal neurologic defect          Laboratory Results:   Lab Results (last 48 hours)     ** No results found for the last 48 hours. **          Other Studies:      Assessment/Plan     Active Problems:    Hypertension in pregnancy         Assessment:  1.  Intrauterine pregnancy at 35w2d gestation with reactive fetal status.    2.  Headache with elevated blood pressure.  History of labetalol 50 mg twice daily for palpitations and questionable atrial fibrillation by cardiology per office H&P.   3.  Obstetrical history significant for is non-contributory.  4.  GBS status: No results found for: [id number]    Plan:  1. fetal and uterine monitoring  continuously, maternal  labs, 24 hour urine for  total protein and check daily weights  2. Plan of care has been reviewed with patient.  3.  Risks, benefits of treatment plan have been discussed.  4.  All questions have been answered.  5.  We will continue labetalol.  Tylenol for headache.  Repeat 24-hour urine.  Observe overnight.       Radha Jacome MD   10/24/2021   13:23 EDT

## 2021-10-25 VITALS
OXYGEN SATURATION: 98 % | TEMPERATURE: 98.7 F | DIASTOLIC BLOOD PRESSURE: 76 MMHG | RESPIRATION RATE: 18 BRPM | HEART RATE: 85 BPM | SYSTOLIC BLOOD PRESSURE: 122 MMHG | WEIGHT: 271.17 LBS | HEIGHT: 63 IN | BODY MASS INDEX: 48.05 KG/M2

## 2021-10-25 LAB — PROT 24H UR-MRATE: 107 MG/24HOURS (ref 0–150)

## 2021-10-25 PROCEDURE — 84156 ASSAY OF PROTEIN URINE: CPT | Performed by: OBSTETRICS & GYNECOLOGY

## 2021-10-25 PROCEDURE — 81050 URINALYSIS VOLUME MEASURE: CPT | Performed by: OBSTETRICS & GYNECOLOGY

## 2021-11-05 ENCOUNTER — HOSPITAL ENCOUNTER (INPATIENT)
Facility: HOSPITAL | Age: 21
LOS: 3 days | Discharge: HOME | End: 2021-11-08
Attending: OBSTETRICS & GYNECOLOGY | Admitting: OBSTETRICS & GYNECOLOGY

## 2021-11-05 LAB
A1 MICROGLOB PLACENTAL VAG QL: NEGATIVE
ABO GROUP BLD: NORMAL
ALBUMIN SERPL-MCNC: 3.1 G/DL (ref 3.5–5.2)
ALBUMIN/GLOB SERPL: 1 G/DL
ALP SERPL-CCNC: 129 U/L (ref 39–117)
ALT SERPL W P-5'-P-CCNC: 6 U/L (ref 1–33)
ANION GAP SERPL CALCULATED.3IONS-SCNC: 15 MMOL/L (ref 5–15)
AST SERPL-CCNC: 11 U/L (ref 1–32)
BILIRUB SERPL-MCNC: <0.2 MG/DL (ref 0–1.2)
BLD GP AB SCN SERPL QL: NEGATIVE
BUN SERPL-MCNC: 11 MG/DL (ref 6–20)
BUN/CREAT SERPL: 20.8 (ref 7–25)
CALCIUM SPEC-SCNC: 8.8 MG/DL (ref 8.6–10.5)
CHLORIDE SERPL-SCNC: 105 MMOL/L (ref 98–107)
CO2 SERPL-SCNC: 18 MMOL/L (ref 22–29)
CREAT SERPL-MCNC: 0.53 MG/DL (ref 0.57–1)
DEPRECATED RDW RBC AUTO: 44.2 FL (ref 37–54)
ERYTHROCYTE [DISTWIDTH] IN BLOOD BY AUTOMATED COUNT: 15.4 % (ref 12.3–15.4)
GFR SERPL CREATININE-BSD FRML MDRD: 143 ML/MIN/1.73
GLOBULIN UR ELPH-MCNC: 3 GM/DL
GLUCOSE SERPL-MCNC: 84 MG/DL (ref 65–99)
HCT VFR BLD AUTO: 37.9 % (ref 34–46.6)
HGB BLD-MCNC: 12.8 G/DL (ref 12–15.9)
MCH RBC QN AUTO: 27.7 PG (ref 26.6–33)
MCHC RBC AUTO-ENTMCNC: 33.9 G/DL (ref 31.5–35.7)
MCV RBC AUTO: 81.9 FL (ref 79–97)
PLATELET # BLD AUTO: 278 10*3/MM3 (ref 140–450)
PMV BLD AUTO: 8.7 FL (ref 6–12)
POTASSIUM SERPL-SCNC: 4.1 MMOL/L (ref 3.5–5.2)
PROT SERPL-MCNC: 6.1 G/DL (ref 6–8.5)
RBC # BLD AUTO: 4.62 10*6/MM3 (ref 3.77–5.28)
RH BLD: POSITIVE
SARS-COV-2 RNA PNL SPEC NAA+PROBE: NOT DETECTED
SODIUM SERPL-SCNC: 138 MMOL/L (ref 136–145)
T&S EXPIRATION DATE: NORMAL
WBC # BLD AUTO: 8.3 10*3/MM3 (ref 3.4–10.8)

## 2021-11-05 PROCEDURE — 86901 BLOOD TYPING SEROLOGIC RH(D): CPT

## 2021-11-05 PROCEDURE — 0 MAGNESIUM SULFATE 20 GM/500ML SOLUTION: Performed by: OBSTETRICS & GYNECOLOGY

## 2021-11-05 PROCEDURE — 80053 COMPREHEN METABOLIC PANEL: CPT | Performed by: OBSTETRICS & GYNECOLOGY

## 2021-11-05 PROCEDURE — 86901 BLOOD TYPING SEROLOGIC RH(D): CPT | Performed by: OBSTETRICS & GYNECOLOGY

## 2021-11-05 PROCEDURE — 86592 SYPHILIS TEST NON-TREP QUAL: CPT | Performed by: OBSTETRICS & GYNECOLOGY

## 2021-11-05 PROCEDURE — 85027 COMPLETE CBC AUTOMATED: CPT | Performed by: OBSTETRICS & GYNECOLOGY

## 2021-11-05 PROCEDURE — 86900 BLOOD TYPING SEROLOGIC ABO: CPT

## 2021-11-05 PROCEDURE — 86850 RBC ANTIBODY SCREEN: CPT | Performed by: OBSTETRICS & GYNECOLOGY

## 2021-11-05 PROCEDURE — 84112 EVAL AMNIOTIC FLUID PROTEIN: CPT | Performed by: OBSTETRICS & GYNECOLOGY

## 2021-11-05 PROCEDURE — U0003 INFECTIOUS AGENT DETECTION BY NUCLEIC ACID (DNA OR RNA); SEVERE ACUTE RESPIRATORY SYNDROME CORONAVIRUS 2 (SARS-COV-2) (CORONAVIRUS DISEASE [COVID-19]), AMPLIFIED PROBE TECHNIQUE, MAKING USE OF HIGH THROUGHPUT TECHNOLOGIES AS DESCRIBED BY CMS-2020-01-R: HCPCS | Performed by: OBSTETRICS & GYNECOLOGY

## 2021-11-05 PROCEDURE — 86900 BLOOD TYPING SEROLOGIC ABO: CPT | Performed by: OBSTETRICS & GYNECOLOGY

## 2021-11-05 PROCEDURE — 63710000001 DIPHENHYDRAMINE PER 50 MG: Performed by: OBSTETRICS & GYNECOLOGY

## 2021-11-05 RX ORDER — ONDANSETRON 4 MG/1
4 TABLET, FILM COATED ORAL EVERY 6 HOURS PRN
Status: DISCONTINUED | OUTPATIENT
Start: 2021-11-05 | End: 2021-11-07 | Stop reason: HOSPADM

## 2021-11-05 RX ORDER — LIDOCAINE HYDROCHLORIDE 10 MG/ML
30 INJECTION, SOLUTION EPIDURAL; INFILTRATION; INTRACAUDAL; PERINEURAL AS NEEDED
Status: DISCONTINUED | OUTPATIENT
Start: 2021-11-05 | End: 2021-11-07 | Stop reason: HOSPADM

## 2021-11-05 RX ORDER — MAGNESIUM SULFATE HEPTAHYDRATE 40 MG/ML
2 INJECTION, SOLUTION INTRAVENOUS CONTINUOUS
Status: DISCONTINUED | OUTPATIENT
Start: 2021-11-05 | End: 2021-11-08 | Stop reason: HOSPADM

## 2021-11-05 RX ORDER — ALBUTEROL SULFATE 90 UG/1
2 AEROSOL, METERED RESPIRATORY (INHALATION) EVERY 4 HOURS PRN
Status: CANCELLED | OUTPATIENT
Start: 2021-11-05

## 2021-11-05 RX ORDER — CALCIUM GLUCONATE 94 MG/ML
1 INJECTION, SOLUTION INTRAVENOUS ONCE AS NEEDED
Status: DISCONTINUED | OUTPATIENT
Start: 2021-11-05 | End: 2021-11-07 | Stop reason: HOSPADM

## 2021-11-05 RX ORDER — SODIUM CHLORIDE 0.9 % (FLUSH) 0.9 %
10 SYRINGE (ML) INJECTION AS NEEDED
Status: DISCONTINUED | OUTPATIENT
Start: 2021-11-05 | End: 2021-11-07 | Stop reason: HOSPADM

## 2021-11-05 RX ORDER — MAGNESIUM CARB/ALUMINUM HYDROX 105-160MG
30 TABLET,CHEWABLE ORAL AS NEEDED
Status: DISCONTINUED | OUTPATIENT
Start: 2021-11-05 | End: 2021-11-07 | Stop reason: HOSPADM

## 2021-11-05 RX ORDER — ACETAMINOPHEN 325 MG/1
650 TABLET ORAL EVERY 4 HOURS PRN
Status: DISCONTINUED | OUTPATIENT
Start: 2021-11-05 | End: 2021-11-07 | Stop reason: HOSPADM

## 2021-11-05 RX ORDER — SODIUM CHLORIDE, SODIUM LACTATE, POTASSIUM CHLORIDE, CALCIUM CHLORIDE 600; 310; 30; 20 MG/100ML; MG/100ML; MG/100ML; MG/100ML
50 INJECTION, SOLUTION INTRAVENOUS CONTINUOUS
Status: DISCONTINUED | OUTPATIENT
Start: 2021-11-05 | End: 2021-11-08 | Stop reason: HOSPADM

## 2021-11-05 RX ORDER — LABETALOL HYDROCHLORIDE 5 MG/ML
20 INJECTION, SOLUTION INTRAVENOUS ONCE
Status: COMPLETED | OUTPATIENT
Start: 2021-11-05 | End: 2021-11-05

## 2021-11-05 RX ORDER — OXYTOCIN-SODIUM CHLORIDE 0.9% IV SOLN 30 UNIT/500ML 30-0.9/5 UT/ML-%
2 SOLUTION INTRAVENOUS
Status: DISCONTINUED | OUTPATIENT
Start: 2021-11-06 | End: 2021-11-07

## 2021-11-05 RX ORDER — DIPHENHYDRAMINE HCL 25 MG
25 CAPSULE ORAL NIGHTLY PRN
Status: DISCONTINUED | OUTPATIENT
Start: 2021-11-05 | End: 2021-11-08 | Stop reason: HOSPADM

## 2021-11-05 RX ORDER — LABETALOL 200 MG/1
200 TABLET, FILM COATED ORAL EVERY 8 HOURS SCHEDULED
Status: DISCONTINUED | OUTPATIENT
Start: 2021-11-05 | End: 2021-11-07

## 2021-11-05 RX ORDER — ONDANSETRON 2 MG/ML
4 INJECTION INTRAMUSCULAR; INTRAVENOUS EVERY 6 HOURS PRN
Status: DISCONTINUED | OUTPATIENT
Start: 2021-11-05 | End: 2021-11-07 | Stop reason: HOSPADM

## 2021-11-05 RX ADMIN — SODIUM CHLORIDE, POTASSIUM CHLORIDE, SODIUM LACTATE AND CALCIUM CHLORIDE 50 ML/HR: 600; 310; 30; 20 INJECTION, SOLUTION INTRAVENOUS at 13:58

## 2021-11-05 RX ADMIN — DINOPROSTONE 10 MG: 10 INSERT VAGINAL at 14:01

## 2021-11-05 RX ADMIN — ACETAMINOPHEN 650 MG: 325 TABLET, FILM COATED ORAL at 18:03

## 2021-11-05 RX ADMIN — LABETALOL 20 MG/4 ML (5 MG/ML) INTRAVENOUS SYRINGE 20 MG: at 13:52

## 2021-11-05 RX ADMIN — DIPHENHYDRAMINE HYDROCHLORIDE 25 MG: 25 CAPSULE ORAL at 21:02

## 2021-11-05 RX ADMIN — LABETALOL HYDROCHLORIDE 200 MG: 200 TABLET, FILM COATED ORAL at 21:47

## 2021-11-05 RX ADMIN — MAGNESIUM SULFATE HEPTAHYDRATE 2 G/HR: 40 INJECTION, SOLUTION INTRAVENOUS at 21:16

## 2021-11-05 NOTE — PLAN OF CARE
Goal Outcome Evaluation:  Plan of Care Reviewed With: patient, spouse, mother        Progress: improving  Outcome Summary: Cervidil induction started. Magnesium infusion initiated and ongoing; BP responsive.

## 2021-11-05 NOTE — NURSING NOTE
Dr. Barrett notified of increased BP. Admission /96; repeat Bps 146/104 and 165/105. Pt c/o of headache 3/10. Order to admit pt, routine labs, stop 12h urine, insert cervidil, tylenol 650mg q4h PRN. MD also advised that pt has seen peds cardiology for possible fetal cardiac issue. Pt has been cleared to deliver here. Peds cardiologist (Dr. Narayanan) is to be called at time of delivery. An echo can be done here if the radiologist is here; however, it cannot be performed by tech. Dr. Narayanan phone # [phone].

## 2021-11-05 NOTE — H&P
ANI Solares  Obstetric History and Physical     Chief Complaint: headache, elevated BP    Subjective     Patient is a 21 y.o. female  currently at 37w0d, who presents with elevated BP and headache. She was seen in the office with normal BP but reports elevated BP at home. Sent to L&D for evaluation.     Her prenatal care is c/b above, palpitations-poss a fib, seen by cardiology who recommends labetalol prn tachycardia. Now with GHTN. Fetal echo done, and the aortic arch was difficult to evaluate. Baby is to have fetal echo upon d/c home.      Prenatal Information:  External Prenatal Results     Pregnancy Outside Results - Transcribed From Office Records - See Scanned Records For Details     Test Value Date Time    ABO  A  21 1337    Rh  Positive  21 1337    Antibody Screen       Varicella IgG ^ 2.3 AI 18 1208    Rubella       Hgb  12.8 g/dL 21 1337       11.6 g/dL 10/24/21 1334       12.3 g/dL 10/19/21 1540       12.1 g/dL 10/18/21 1532    Hct  37.9 % 21 1337       35.0 % 10/24/21 1334       36.4 % 10/19/21 1540       36.2 % 10/18/21 1532    Glucose Fasting GTT       Glucose Tolerance Test 1 hour       Glucose Tolerance Test 3 hour       Gonorrhea (discrete)       Chlamydia (discrete)       RPR       VDRL       Syphilis Antibody       HBsAg       Herpes Simplex Virus PCR       Herpes Simplex VIrus Culture       HIV       Hep C RNA Quant PCR       Hep C Antibody       AFP       Group B Strep       GBS Susceptibility to Clindamycin       GBS Susceptibility to Erythromycin       Fetal Fibronectin       Genetic Testing, Maternal Blood             Drug Screening     Test Value Date Time    Urine Drug Screen       Amphetamine Screen       Barbiturate Screen       Benzodiazepine Screen       Methadone Screen       Phencyclidine Screen       Opiates Screen       THC Screen       Cocaine Screen       Propoxyphene Screen       Buprenorphine Screen       Methamphetamine Screen       Oxycodone  Screen       Tricyclic Antidepressants Screen             Legend    ^: Historical                         Past OB History:    none       Past Medical History: Past Medical History:   Diagnosis Date   • Allergic    • Asthma    • Gestational hypertension    • Palpitations    • PCOS (polycystic ovarian syndrome)         Past Surgical History History reviewed. No pertinent surgical history.     Family History: Family History   Problem Relation Age of Onset   • Breast cancer Mother       Social History:  reports that she quit smoking about 9 years ago. She has never used smokeless tobacco.   reports no history of alcohol use.   reports no history of drug use.        General ROS: Pertinent items are noted in HPI    Objective      Vitals:     Vitals:    11/05/21 1205 11/05/21 1207 11/05/21 1259 11/05/21 1301   BP:  (!) 160/105  (!) 155/105   BP Location:       Patient Position:       Pulse: 107 96 105 105   Resp:       Temp:       TempSrc:       SpO2: 99%  100%    Weight:       Height:           Fetal Heart Rate Assessment:   140, mod variability    [place]:   quiet     Physical Exam:     General Appearance:    Alert, cooperative, in no acute distress   Abdomen:     Soft, non-tender, EFW 7-1   Pelvic Exam:    Presentation: vtx    Cervix: was checked (by RN): 1 cm / 50% % / -2, pelvis clinically adequate   Extremities:   Moves all extremities well   Skin:   No bleeding, bruising or rash         Laboratory Results:   Lab Results (last 48 hours)     Procedure Component Value Units Date/Time    Comprehensive Metabolic Panel [[id number]]  (Abnormal) Collected: 11/05/21 1337    Specimen: Blood Updated: 11/05/21 1412     Glucose 84 mg/dL      BUN 11 mg/dL      Creatinine 0.53 mg/dL      Sodium 138 mmol/L      Potassium 4.1 mmol/L      Chloride 105 mmol/L      CO2 18.0 mmol/L      Calcium 8.8 mg/dL      Total Protein 6.1 g/dL      Albumin 3.10 g/dL      ALT (SGPT) 6 U/L      AST (SGOT) 11 U/L      Alkaline Phosphatase 129 U/L       Total Bilirubin <0.2 mg/dL      eGFR Non African Amer 143 mL/min/1.73      Globulin 3.0 gm/dL      A/G Ratio 1.0 g/dL      BUN/Creatinine Ratio 20.8     Anion Gap 15.0 mmol/L     Narrative:      GFR Normal >60  Chronic Kidney Disease <60  Kidney Failure <15      CBC (No Diff) [[id number]]  (Normal) Collected: 11/05/21 1337    Specimen: Blood Updated: 11/05/21 1353     WBC 8.30 10*3/mm3      RBC 4.62 10*6/mm3      Hemoglobin 12.8 g/dL      Hematocrit 37.9 %      MCV 81.9 fL      MCH 27.7 pg      MCHC 33.9 g/dL      RDW 15.4 %      RDW-SD 44.2 fl      MPV 8.7 fL      Platelets 278 10*3/mm3     RPR [[id number]] Collected: 11/05/21 1337    Specimen: Blood Updated: 11/05/21 1350    COVID PRE-OP / PRE-PROCEDURE SCREENING ORDER (NO ISOLATION) - Swab, Nasopharynx [[id number]] Collected: 11/05/21 1341    Specimen: Swab from Nasopharynx Updated: 11/05/21 1350    Narrative:      The following orders were created for panel order COVID PRE-OP / PRE-PROCEDURE SCREENING ORDER (NO ISOLATION) - Swab, Nasopharynx.  Procedure                               Abnormality         Status                     ---------                               -----------         ------                     COVID-19,CEPHEID/DELANO/BD...[[id number]]                      In process                   Please view results for these tests on the individual orders.    COVID-19,CEPHEID/DELANO/BDMAX,COR/PHOEBE/PAD/NIDIA IN-HOUSE(OR EMERGENT/ADD-ON),NP SWAB IN TRANSPORT MEDIA 3-4 HR TAT, RT-PCR - Swab, Nasopharynx [[id number]] Collected: 11/05/21 1341    Specimen: Swab from Nasopharynx Updated: 11/05/21 1350             Assessment/Plan     Active Problems:    Pregnant         Assessment:  1.  Intrauterine pregnancy at 37w0d gestation with reassuring fetal status.    2.   GHTN with severe pressures  3.  GBS status: Negative  4. FSR    Plan:  1. cervical ripening with  Cervidil  2. Magnesium for seizure prophylaxis       Misty Barrett MD   11/5/2021   14:22 EDT

## 2021-11-06 ENCOUNTER — ANESTHESIA EVENT (OUTPATIENT)
Dept: LABOR AND DELIVERY | Facility: HOSPITAL | Age: 21
End: 2021-11-06

## 2021-11-06 ENCOUNTER — ANESTHESIA (OUTPATIENT)
Dept: LABOR AND DELIVERY | Facility: HOSPITAL | Age: 21
End: 2021-11-06

## 2021-11-06 PROCEDURE — C1755 CATHETER, INTRASPINAL: HCPCS | Performed by: ANESTHESIOLOGY

## 2021-11-06 PROCEDURE — 25010000002 ONDANSETRON PER 1 MG: Performed by: OBSTETRICS & GYNECOLOGY

## 2021-11-06 PROCEDURE — 0 MAGNESIUM SULFATE 20 GM/500ML SOLUTION: Performed by: OBSTETRICS & GYNECOLOGY

## 2021-11-06 PROCEDURE — 10907ZC DRAINAGE OF AMNIOTIC FLUID, THERAPEUTIC FROM PRODUCTS OF CONCEPTION, VIA NATURAL OR ARTIFICIAL OPENING: ICD-10-PCS | Performed by: OBSTETRICS & GYNECOLOGY

## 2021-11-06 PROCEDURE — 25010000002 FENTANYL CITRATE (PF) 100 MCG/2ML SOLUTION: Performed by: ANESTHESIOLOGY

## 2021-11-06 RX ORDER — FENTANYL CITRATE 50 UG/ML
INJECTION, SOLUTION INTRAMUSCULAR; INTRAVENOUS
Status: COMPLETED
Start: 2021-11-06 | End: 2021-11-06

## 2021-11-06 RX ORDER — EPHEDRINE SULFATE 5 MG/ML
10 INJECTION INTRAVENOUS
Status: DISCONTINUED | OUTPATIENT
Start: 2021-11-06 | End: 2021-11-07 | Stop reason: HOSPADM

## 2021-11-06 RX ORDER — FENTANYL CITRATE 50 UG/ML
INJECTION, SOLUTION INTRAMUSCULAR; INTRAVENOUS AS NEEDED
Status: DISCONTINUED | OUTPATIENT
Start: 2021-11-06 | End: 2021-11-07 | Stop reason: SURG

## 2021-11-06 RX ORDER — FENTANYL 0.2 MG/100ML-BUPIV 0.125%-NACL 0.9% EPIDURAL INJ 2/0.125 MCG/ML-%
SOLUTION INJECTION
Status: COMPLETED
Start: 2021-11-06 | End: 2021-11-06

## 2021-11-06 RX ORDER — FENTANYL 0.2 MG/100ML-BUPIV 0.125%-NACL 0.9% EPIDURAL INJ 2/0.125 MCG/ML-%
SOLUTION INJECTION CONTINUOUS
Status: DISCONTINUED | OUTPATIENT
Start: 2021-11-06 | End: 2021-11-08 | Stop reason: HOSPADM

## 2021-11-06 RX ORDER — LIDOCAINE HYDROCHLORIDE AND EPINEPHRINE 10; 10 MG/ML; UG/ML
INJECTION, SOLUTION INFILTRATION; PERINEURAL AS NEEDED
Status: DISCONTINUED | OUTPATIENT
Start: 2021-11-06 | End: 2021-11-07 | Stop reason: SURG

## 2021-11-06 RX ADMIN — LIDOCAINE HYDROCHLORIDE,EPINEPHRINE BITARTRATE 3 ML: 10; .01 INJECTION, SOLUTION INFILTRATION; PERINEURAL at 08:05

## 2021-11-06 RX ADMIN — Medication 10 ML/HR: at 08:15

## 2021-11-06 RX ADMIN — OXYTOCIN 2 MILLI-UNITS/MIN: 10 INJECTION INTRAVENOUS at 07:25

## 2021-11-06 RX ADMIN — LABETALOL HYDROCHLORIDE 200 MG: 200 TABLET, FILM COATED ORAL at 23:00

## 2021-11-06 RX ADMIN — SODIUM CHLORIDE, POTASSIUM CHLORIDE, SODIUM LACTATE AND CALCIUM CHLORIDE 50 ML/HR: 600; 310; 30; 20 INJECTION, SOLUTION INTRAVENOUS at 07:19

## 2021-11-06 RX ADMIN — FENTANYL CITRATE 100 MCG: 50 INJECTION, SOLUTION INTRAMUSCULAR; INTRAVENOUS at 08:10

## 2021-11-06 RX ADMIN — ACETAMINOPHEN 650 MG: 325 TABLET, FILM COATED ORAL at 12:12

## 2021-11-06 RX ADMIN — MAGNESIUM SULFATE HEPTAHYDRATE 2 G/HR: 40 INJECTION, SOLUTION INTRAVENOUS at 17:53

## 2021-11-06 RX ADMIN — Medication: at 23:10

## 2021-11-06 RX ADMIN — MAGNESIUM SULFATE HEPTAHYDRATE 2 G/HR: 40 INJECTION, SOLUTION INTRAVENOUS at 07:19

## 2021-11-06 RX ADMIN — ACETAMINOPHEN 650 MG: 325 TABLET, FILM COATED ORAL at 01:41

## 2021-11-06 RX ADMIN — ONDANSETRON 4 MG: 2 INJECTION INTRAMUSCULAR; INTRAVENOUS at 23:45

## 2021-11-06 RX ADMIN — SODIUM CHLORIDE, POTASSIUM CHLORIDE, SODIUM LACTATE AND CALCIUM CHLORIDE 50 ML/HR: 600; 310; 30; 20 INJECTION, SOLUTION INTRAVENOUS at 08:32

## 2021-11-06 RX ADMIN — Medication: at 15:46

## 2021-11-06 RX ADMIN — ACETAMINOPHEN 650 MG: 325 TABLET, FILM COATED ORAL at 05:50

## 2021-11-06 NOTE — PROGRESS NOTES
" [facility]  Obstetric Progress Note    Subjective   Comfortable c epidural.     Objective     Vitals:  Vitals:    11/06/21 1719 11/06/21 1730 11/06/21 1800 11/06/21 1804   BP:  129/80 118/76    BP Location:   Left arm    Patient Position:   Lying    Pulse: 96  99 93   Resp:   18    Temp:   97.9 °F (36.6 °C)    TempSrc:   Oral    SpO2: 100%  100% 100%   Weight:       Height:         Flowsheet Rows      First Filed Value   Admission Height 160 cm (63\") Documented at 11/05/2021 1135   Admission Weight 126 kg (276 lb 10.8 oz) Documented at 11/05/2021 1135          Intake/Output Summary (Last 24 hours) at 11/6/2021 1812  Last data filed at 11/6/2021 1801  Gross per 24 hour   Intake 4913.53 ml   Output 2600 ml   Net 2313.53 ml       Fetal Heart Rate Assessment:   Category 1  [place]:  q 2    Physical Exam:  General: Patient is in no acute distress    Pelvic Exam: was checked (by RN): 4 cm / 75% % / -1            Assessment/Plan     Active Problems:    Pregnant         Assessment:  1.  Intrauterine pregnancy at 37w1d gestation with reactive fetal status.    2.  IOL d/t GHTN at term.   3.  GBS status: Negative    Plan:  1. Cont pitocin IOL.   2. Plan of care has been reviewed with patient.  3.  Risks, benefits of treatment plan have been discussed.  4.  All questions have been answered.        Sallie Almanzar MD  11/6/2021  18:12 EDT    "

## 2021-11-06 NOTE — PROGRESS NOTES
"[facility]  Obstetric Progress Note    Subjective   Comfortable without complaint.     Objective     Vitals:  Vitals:    11/06/21 1830 11/06/21 1834 11/06/21 1900 11/06/21 1909   BP: 112/60  132/85    BP Location:       Patient Position:       Pulse:  81  77   Resp:       Temp:       TempSrc:       SpO2:  99%  99%   Weight:       Height:         Flowsheet Rows      First Filed Value   Admission Height 160 cm (63\") Documented at 11/05/2021 1135   Admission Weight 126 kg (276 lb 10.8 oz) Documented at 11/05/2021 1135          Intake/Output Summary (Last 24 hours) at 11/6/2021 1951  Last data filed at 11/6/2021 1801  Gross per 24 hour   Intake 4330.53 ml   Output 2100 ml   Net 2230.53 ml       Fetal Heart Rate Assessment:   Category 1  [place]:  q 2    Physical Exam:  General: Patient is in no acute distress    Pelvic Exam: was checked (by me): 5 cm / 75% % / -1            Assessment/Plan     Active Problems:    Pregnant         Assessment:  1.  Intrauterine pregnancy at 37w1d gestation with reactive fetal status.    2.  IOL d/t GHTN at term  3.  GBS status: Negative    Plan:  1. Cont pitocin IOL.   2. Plan of care has been reviewed with patient.  3.  Risks, benefits of treatment plan have been discussed.  4.  All questions have been answered.        Sallie Almanzar MD  11/6/2021  19:51 EDT    "

## 2021-11-06 NOTE — PROGRESS NOTES
"[facility]  Obstetric Progress Note    Subjective   Pt sent yesterday from the office due to elevated BP per Dr. Barrett (per report given to me by Dr. Velásquez who was on call last night).  I assumed care at 0700 this am.  Per report given to me pt was given Labetalol 200mg oral twice due to elevated Bp of 155/105, since that time, however most Bps have been normal currently getting epidural and BP is 144/104, however just prior Bps were 110s-120s/70s.      Objective     Vitals:  Vitals:    11/06/21 0730 11/06/21 0735 11/06/21 0736 11/06/21 0744   BP:   (!) 144/104    Pulse: 89 106  86   Resp:  18     Temp:  97.9 °F (36.6 °C)     TempSrc:  Oral     SpO2: 98% 100%  100%   Weight:       Height:         Flowsheet Rows      First Filed Value   Admission Height 160 cm (63\") Documented at 11/05/2021 1135   Admission Weight 126 kg (276 lb 10.8 oz) Documented at 11/05/2021 1135          Intake/Output Summary (Last 24 hours) at 11/6/2021 0808  Last data filed at 11/6/2021 0717  Gross per 24 hour   Intake 3043.17 ml   Output 1850 ml   Net 1193.17 ml       Fetal Heart Rate Assessment:   Category 1  [place]:  Not well picking up    Physical Exam:  General: Patient is in no acute distress    Pelvic Exam: was checked (by me): 1-2 cm / 50% % / -1 and AROM- Clear            Assessment/Plan     Active Problems:    Pregnant         Assessment:  1.  Intrauterine pregnancy at 37w1d gestation with reactive fetal status.    2.  IOL d/t GHTN at term  3.  GBS status: Negative    Plan:  1. Cont pitocin IOL and epidural.   2. Plan of care has been reviewed with patient.  3.  Risks, benefits of treatment plan have been discussed.  4.  All questions have been answered.        Sallie Almanzar MD  11/6/2021  08:08 EDT    "

## 2021-11-06 NOTE — PROGRESS NOTES
Comfortable c epidural  2/50/-1 IUPC placed without difficulty  Cont pitoicn IOL.  FSR/ category 1

## 2021-11-06 NOTE — ANESTHESIA PROCEDURE NOTES
Labor Epidural      Patient location during procedure: OB  Start Time: 11/6/2021 8:00 AM  Stop Time: 11/6/2021 8:15 AM  Indication:at surgeon's request  Performed By  Anesthesiologist: Mathieu Warner MD  Preanesthetic Checklist  Completed: patient identified, IV checked, site marked, risks and benefits discussed, surgical consent, monitors and equipment checked, pre-op evaluation and timeout performed  Additional Notes  SITTING BACK PREP SKIN WHEAL L34 (PRESUMED--NO PALPABLE LANDMARKS) EPI SPACE VIA MASTER X1 ATTEMPT W TUOHY NO BLOOD CSF OR PARESTHESIA, CATH ADV 3CM NEG ASP TEST NEG DOSED INCREMENTALLY Q3-5MIN WITH NEG ASP PRIOR TO EACH.  Prep:  Pt Position:sitting  Sterile Tech:gloves, cap, sterile barrier and mask  Prep:chlorhexidine gluconate and isopropyl alcohol  Monitoring:continuous pulse oximetry, EKG and blood pressure monitoring  Epidural Block Procedure:  Approach:midline  Guidance:landmark technique  Location:L3-L4  Needle Type:Tuohy  Needle Gauge:17 G  Loss of Resistance Medium: saline  Loss of Resistance: 8cm  Cath Depth at skin:11 cm  Paresthesia: none  Aspiration:negative  Test Dose:negative  Post Assessment:  Dressing:secured with tape and occlusive dressing applied  Pt Tolerance:patient tolerated the procedure well with no apparent complications  Complications:no

## 2021-11-06 NOTE — ANESTHESIA PREPROCEDURE EVALUATION
Anesthesia Evaluation     Patient summary reviewed and Nursing notes reviewed   NPO Solid Status: > 8 hours  NPO Liquid Status: > 8 hours           Airway   Mallampati: I  TM distance: >3 FB  Neck ROM: full  No difficulty expected  Dental - normal exam     Pulmonary - normal exam   (+) asthma,  Cardiovascular - normal exam    (+) hypertension,       Neuro/Psych- negative ROS  GI/Hepatic/Renal/Endo    (+) morbid obesity,  renal disease,     Musculoskeletal (-) negative ROS    Abdominal  - normal exam    Bowel sounds: normal.   Substance History - negative use     OB/GYN    (+) Pregnant, pregnancy induced hypertension        Other                        Anesthesia Plan    ASA 3     epidural       Anesthetic plan, all risks, benefits, and alternatives have been provided, discussed and informed consent has been obtained with: patient.

## 2021-11-06 NOTE — PLAN OF CARE
Goal Outcome Evaluation:  Plan of Care Reviewed With: patient, spouse, mother        Progress: improving

## 2021-11-07 LAB
BASOPHILS # BLD AUTO: 0 10*3/MM3 (ref 0–0.2)
BASOPHILS NFR BLD AUTO: 0.3 % (ref 0–1.5)
DEPRECATED RDW RBC AUTO: 42 FL (ref 37–54)
EOSINOPHIL # BLD AUTO: 0 10*3/MM3 (ref 0–0.4)
EOSINOPHIL NFR BLD AUTO: 0.1 % (ref 0.3–6.2)
ERYTHROCYTE [DISTWIDTH] IN BLOOD BY AUTOMATED COUNT: 14.9 % (ref 12.3–15.4)
HCT VFR BLD AUTO: 32.7 % (ref 34–46.6)
HGB BLD-MCNC: 11.1 G/DL (ref 12–15.9)
LYMPHOCYTES # BLD AUTO: 0.7 10*3/MM3 (ref 0.7–3.1)
LYMPHOCYTES NFR BLD AUTO: 4.2 % (ref 19.6–45.3)
MCH RBC QN AUTO: 27 PG (ref 26.6–33)
MCHC RBC AUTO-ENTMCNC: 34 G/DL (ref 31.5–35.7)
MCV RBC AUTO: 79.5 FL (ref 79–97)
MONOCYTES # BLD AUTO: 0.7 10*3/MM3 (ref 0.1–0.9)
MONOCYTES NFR BLD AUTO: 4.2 % (ref 5–12)
NEUTROPHILS NFR BLD AUTO: 14.7 10*3/MM3 (ref 1.7–7)
NEUTROPHILS NFR BLD AUTO: 91.2 % (ref 42.7–76)
NRBC BLD AUTO-RTO: 0 /100 WBC (ref 0–0.2)
PLATELET # BLD AUTO: 240 10*3/MM3 (ref 140–450)
PMV BLD AUTO: 7.7 FL (ref 6–12)
RBC # BLD AUTO: 4.11 10*6/MM3 (ref 3.77–5.28)
RPR SER QL: NORMAL
WBC # BLD AUTO: 16.2 10*3/MM3 (ref 3.4–10.8)

## 2021-11-07 PROCEDURE — 51702 INSERT TEMP BLADDER CATH: CPT

## 2021-11-07 PROCEDURE — 0KQM0ZZ REPAIR PERINEUM MUSCLE, OPEN APPROACH: ICD-10-PCS | Performed by: OBSTETRICS & GYNECOLOGY

## 2021-11-07 PROCEDURE — 25010000002 METHYLERGONOVINE MALEATE PER 0.2 MG

## 2021-11-07 PROCEDURE — 51703 INSERT BLADDER CATH COMPLEX: CPT

## 2021-11-07 PROCEDURE — 0 MAGNESIUM SULFATE 20 GM/500ML SOLUTION: Performed by: OBSTETRICS & GYNECOLOGY

## 2021-11-07 PROCEDURE — 85025 COMPLETE CBC W/AUTO DIFF WBC: CPT | Performed by: OBSTETRICS & GYNECOLOGY

## 2021-11-07 RX ORDER — IBUPROFEN 600 MG/1
600 TABLET ORAL EVERY 6 HOURS PRN
Status: DISCONTINUED | OUTPATIENT
Start: 2021-11-07 | End: 2021-11-08 | Stop reason: HOSPADM

## 2021-11-07 RX ORDER — DOCUSATE SODIUM 100 MG/1
100 CAPSULE, LIQUID FILLED ORAL 2 TIMES DAILY
Status: DISCONTINUED | OUTPATIENT
Start: 2021-11-07 | End: 2021-11-08 | Stop reason: HOSPADM

## 2021-11-07 RX ORDER — OXYTOCIN-SODIUM CHLORIDE 0.9% IV SOLN 30 UNIT/500ML 30-0.9/5 UT/ML-%
999 SOLUTION INTRAVENOUS ONCE
Status: DISCONTINUED | OUTPATIENT
Start: 2021-11-07 | End: 2021-11-07

## 2021-11-07 RX ORDER — BUPIVACAINE HYDROCHLORIDE 5 MG/ML
INJECTION, SOLUTION EPIDURAL; INTRACAUDAL
Status: DISPENSED
Start: 2021-11-07 | End: 2021-11-07

## 2021-11-07 RX ORDER — OXYTOCIN-SODIUM CHLORIDE 0.9% IV SOLN 30 UNIT/500ML 30-0.9/5 UT/ML-%
125 SOLUTION INTRAVENOUS CONTINUOUS PRN
Status: COMPLETED | OUTPATIENT
Start: 2021-11-07 | End: 2021-11-07

## 2021-11-07 RX ORDER — IBUPROFEN 600 MG/1
600 TABLET ORAL ONCE
Status: COMPLETED | OUTPATIENT
Start: 2021-11-07 | End: 2021-11-07

## 2021-11-07 RX ORDER — OXYTOCIN 10 [USP'U]/ML
INJECTION, SOLUTION INTRAMUSCULAR; INTRAVENOUS
Status: DISCONTINUED
Start: 2021-11-07 | End: 2021-11-07 | Stop reason: WASHOUT

## 2021-11-07 RX ORDER — METHYLERGONOVINE MALEATE 0.2 MG/ML
200 INJECTION INTRAVENOUS ONCE AS NEEDED
Status: COMPLETED | OUTPATIENT
Start: 2021-11-07 | End: 2021-11-07

## 2021-11-07 RX ORDER — METHYLERGONOVINE MALEATE 0.2 MG/ML
INJECTION INTRAVENOUS
Status: COMPLETED
Start: 2021-11-07 | End: 2021-11-07

## 2021-11-07 RX ORDER — CARBOPROST TROMETHAMINE 250 UG/ML
INJECTION, SOLUTION INTRAMUSCULAR
Status: DISCONTINUED
Start: 2021-11-07 | End: 2021-11-07 | Stop reason: WASHOUT

## 2021-11-07 RX ORDER — MISOPROSTOL 200 UG/1
TABLET ORAL
Status: COMPLETED
Start: 2021-11-07 | End: 2021-11-07

## 2021-11-07 RX ORDER — ACETAMINOPHEN 500 MG
1000 TABLET ORAL EVERY 8 HOURS PRN
Status: DISCONTINUED | OUTPATIENT
Start: 2021-11-07 | End: 2021-11-08 | Stop reason: HOSPADM

## 2021-11-07 RX ORDER — OXYCODONE HYDROCHLORIDE 5 MG/1
5 TABLET ORAL ONCE
Status: COMPLETED | OUTPATIENT
Start: 2021-11-07 | End: 2021-11-07

## 2021-11-07 RX ORDER — OXYTOCIN 10 [USP'U]/ML
INJECTION, SOLUTION INTRAMUSCULAR; INTRAVENOUS
Status: DISPENSED
Start: 2021-11-07 | End: 2021-11-07

## 2021-11-07 RX ORDER — TRANEXAMIC ACID 100 MG/ML
INJECTION, SOLUTION INTRAVENOUS
Status: COMPLETED
Start: 2021-11-07 | End: 2021-11-07

## 2021-11-07 RX ORDER — MISOPROSTOL 200 UG/1
800 TABLET ORAL ONCE AS NEEDED
Status: COMPLETED | OUTPATIENT
Start: 2021-11-07 | End: 2021-11-07

## 2021-11-07 RX ORDER — CARBOPROST TROMETHAMINE 250 UG/ML
250 INJECTION, SOLUTION INTRAMUSCULAR
Status: DISCONTINUED | OUTPATIENT
Start: 2021-11-07 | End: 2021-11-07 | Stop reason: HOSPADM

## 2021-11-07 RX ORDER — OXYTOCIN-SODIUM CHLORIDE 0.9% IV SOLN 30 UNIT/500ML 30-0.9/5 UT/ML-%
250 SOLUTION INTRAVENOUS CONTINUOUS
Status: DISCONTINUED | OUTPATIENT
Start: 2021-11-07 | End: 2021-11-07

## 2021-11-07 RX ORDER — PRENATAL VIT/IRON FUM/FOLIC AC 27MG-0.8MG
1 TABLET ORAL DAILY
Status: DISCONTINUED | OUTPATIENT
Start: 2021-11-07 | End: 2021-11-08 | Stop reason: HOSPADM

## 2021-11-07 RX ORDER — SODIUM CHLORIDE 0.9 % (FLUSH) 0.9 %
1-10 SYRINGE (ML) INJECTION AS NEEDED
Status: DISCONTINUED | OUTPATIENT
Start: 2021-11-07 | End: 2021-11-08 | Stop reason: HOSPADM

## 2021-11-07 RX ORDER — TRANEXAMIC ACID 100 MG/ML
1000 INJECTION, SOLUTION INTRAVENOUS ONCE
Status: COMPLETED | OUTPATIENT
Start: 2021-11-07 | End: 2021-11-07

## 2021-11-07 RX ADMIN — METHYLERGONOVINE MALEATE 200 MCG: 0.2 INJECTION INTRAVENOUS at 09:23

## 2021-11-07 RX ADMIN — Medication: at 06:08

## 2021-11-07 RX ADMIN — OXYTOCIN 30 MILLI-UNITS/MIN: 10 INJECTION INTRAVENOUS at 03:09

## 2021-11-07 RX ADMIN — TRANEXAMIC ACID 1000 MG: 100 INJECTION, SOLUTION INTRAVENOUS at 09:29

## 2021-11-07 RX ADMIN — MAGNESIUM SULFATE HEPTAHYDRATE 2 G/HR: 40 INJECTION, SOLUTION INTRAVENOUS at 03:41

## 2021-11-07 RX ADMIN — WITCH HAZEL 1 PAD: 500 SOLUTION RECTAL; TOPICAL at 14:50

## 2021-11-07 RX ADMIN — MISOPROSTOL 800 MCG: 200 TABLET ORAL at 09:30

## 2021-11-07 RX ADMIN — OXYTOCIN 125 ML/HR: 10 INJECTION INTRAVENOUS at 10:33

## 2021-11-07 RX ADMIN — DOCUSATE SODIUM 100 MG: 100 CAPSULE, LIQUID FILLED ORAL at 20:33

## 2021-11-07 RX ADMIN — IBUPROFEN 600 MG: 600 TABLET, FILM COATED ORAL at 10:41

## 2021-11-07 RX ADMIN — Medication 1 APPLICATION: at 14:50

## 2021-11-07 RX ADMIN — IBUPROFEN 600 MG: 600 TABLET, FILM COATED ORAL at 19:35

## 2021-11-07 RX ADMIN — OXYCODONE 5 MG: 5 TABLET ORAL at 10:41

## 2021-11-07 NOTE — LACTATION NOTE
Provided mother with handouts, nipple cream and gel pads. Basic teaching done. Denies history of breast surgery. Denies routine medication use. Denies wool allergy. Has an Lynsey pump at home. Uses [facility] in [facility][1]. Baby was transferred to [facility]. Provided mother with Symphony pump. Pumped with her for 15 mins, for mist. Instructed her on use and cleaning. Discussed pumping Q3hrs X20 mins and prn. Encouraged to call for assist as needed.

## 2021-11-07 NOTE — PLAN OF CARE
Goal Outcome Evaluation:     Pt delivered viable infant male via spontaneous vaginal delivery. Pt recovering/resting in L&D. Infant transferred.

## 2021-11-07 NOTE — L&D DELIVERY NOTE
[facility]  Vaginal Delivery Note    Diagnosis     Patient is a 21 y.o. female  currently at 37w2d, who presents with IOL for GHTN at term.      Delivery     Delivery:  Spontaneous Vaginal Delivery    Date of Delivery:  2021   Anesthesia:  Epidural   Delivering clinician: Sallie Almanzar MD      Delivery narrative:  Pt presented for IOL d/t GHTN with some severe features/ Bps.  She was induced c Dr. Barrett on  and rec'd cervidil overnight.    I assumed care on  she was 1-2 cm at that time and underwent AROM (0800 on ).  She had a slow progress of labor but progress was adequate and FHTs were category 1 throughout labor until about 0200 when she had decreased variability to minimum.  There were no decelerations and she had an acceleration c scalp stimulation.  She had intermittent periods of category 2 tracing after that time which did not respond to repositioning or maternal oxygen/ scalp stimulation from 0400 until about 0600, eventually after those maneuvers were not effective pitocin was discontinued and baby had a category 1 tracing.  Tracing was c/w magnesium effect and fetal sleep cycle.       She was complete by about 0630.  She began pushing but was having contractions only every 8-10 minutes as her pitocin had been off, it was restarted but her pushing was ineffective and contractions were infrequent so she stopped pushing after 90 minutes at about 0800.  She restarted pushing once contractions were adequate at about 0845 and pushed for just at 30 minutes to deliver without difficulty.  Tracing was category 1 during her entire pushing.     Baby was pink c good FHR but no respiratory effort and received  rescusitation per delivery team including ER MD who performed intubation as well as Pediatrician and Neonatologist who will accept baby in transfer.  Baby currently stable/ intubated.  HR and Sa02 were excellent throughout, intubation performed d/t no respiratory effort c/w  magnesium effect.    ABG pH 7.31 BE -1.7 VBG pH 7.412 BE -0.8    Infant    Findings: VMI     Apgars: 3 & 5 at 1 and 5 minutes &5 @ 10 minutes.      Placenta, Cord, and Fluid     Placenta delivered spontaneous  3VC      Bimanual massage and Pitocin 30 U in 500cc bolus given after placental delivery. Uterus was boggy and pt actively bleeding from atony.  Pt with both HTN and asthma, however, Bps have been normal over the past few hours so the decision was made to use Methergine as first line for uterine atony.  Treatment for pp hemorrhage as follows;    Initially bimanual massage, no e/o retained POC and 2nd degree laceration was barely bleeding, hemorrhage was clearly d/t atony.     Placenta delivery 0920 c bimanual massage immediate  0921 Pitocin 30 U in 500cc bolus   0923 Methergine 0.2mg IM x 1  0926 Magnesium discontinued (bimanual massage throughout this time)  0929 TXA 1g IV infusion given  0930 cytotec 800 mcg OR given    These interventions resulted in good uterine tone and resolution of pp hemorrhage.      Maternal vitals were stable throughout HR 80s, /70s.  Current vitals HR 87 /88 Sa02 100% RA  Tc 98.1.       Lacerations       had a 2nd degree laceration and which was repaired c 3.0 vicryl rapide in the usual fashion.    Estimated Blood Loss  1200  cc         Disposition  Mother to Mother Baby/Postpartum  in stable condition currently.  Baby to NICU  in stable condition currently.      Sallie Almanzar MD  11/07/21  10:14 EST

## 2021-11-07 NOTE — SIGNIFICANT NOTE
11/07/21 1015   Coping/Psychosocial   Observed Emotional State calm  (spouse seemed calm when speaking with )   Verbalized Emotional State acceptance   Family/Support Persons spouse   Involvement in Care at bedside; attentive to patient; interacting with patient; supportive of patient; participating in care   Family/Support System Care other (see comments)  (pastoral presence and spiritual care offered)   Additional Documentation Spiritual Care (Group)   Spiritual Care   Use of Spiritual Resources non-Jewish use of spiritual care   Spiritual Care Source  initiative  (respond to code pink)   Spiritual Care Follow-Up follow-up, none required as presently assessed   Response to Spiritual Care thanks expressed   Spiritual Care Interventions supportive conversation provided   Spiritual Care Visit Type initial   Spiritual Care Request other (see comments)  (attempted visit, offer of spiritual care)   Receptivity to Spiritual Care visit declined   Coping/Psychosocial Interventions   Supportive Measures active listening utilized      responded to code pink. Pt's spouse met  at the door and stated that they were doing OK in the moment. They had no spiritual needs at this time.  introduced and offered spiritual care at any time and offered spouse to contact chaplains if needed. He was thankful and would consider it.  checked in with staff and all seemed to be OK.  stepped away willing to return if needed. No other needs.

## 2021-11-07 NOTE — PLAN OF CARE
Goal Outcome Evaluation:     Patient repositioned throughout the night, blood pressures improved, patient's pain reduced with epidural bolus, infant had minimal variability at times throughout the night and improved with interventions, patient attempted to rest between care, patient's mother and spouse at bedside supporting patient         Progress: improving

## 2021-11-07 NOTE — PROGRESS NOTES
FHTs have been category 2 intermittently, at 0200 see my paper note (Epic downtime d/t daylight savings time).    Baby had + accel c scalp stim at that time.   Then intermittently periods of minimal variability without any decelerations but also no accelerations, intrauterine resuscitation was performed c position changes, IV fluid small bolus, scalp stim and maternal oxygen.  Eventually pitocin was discontinued when there was no response to all the above, however, again there were no declerations during this time. Currently Category 1 tracing, looks like earlier tracing was a fetal sleep cycle as it resolved spontaneously and now tracing is category 1.    Cx is C/C/-1, plan to start pushing but ctxns are rare d/t stopping pitocin earlier, is now back on and I placed another IUPC (it had come out earlier) for better assessment.  Will see how frequent ctxns are and may postpone pushing until more regular pattern.   Pt tolerating all well, is tired but pain well controlled and ready to push.

## 2021-11-08 VITALS
HEIGHT: 63 IN | WEIGHT: 276.68 LBS | OXYGEN SATURATION: 98 % | HEART RATE: 84 BPM | TEMPERATURE: 97.9 F | BODY MASS INDEX: 49.02 KG/M2 | SYSTOLIC BLOOD PRESSURE: 97 MMHG | DIASTOLIC BLOOD PRESSURE: 61 MMHG | RESPIRATION RATE: 16 BRPM

## 2021-11-08 PROBLEM — Z34.90 PREGNANT: Status: RESOLVED | Noted: 2021-10-19 | Resolved: 2021-11-08

## 2021-11-08 LAB
BASOPHILS # BLD AUTO: 0 10*3/MM3 (ref 0–0.2)
BASOPHILS NFR BLD AUTO: 0.3 % (ref 0–1.5)
DEPRECATED RDW RBC AUTO: 45.1 FL (ref 37–54)
EOSINOPHIL # BLD AUTO: 0.1 10*3/MM3 (ref 0–0.4)
EOSINOPHIL NFR BLD AUTO: 0.5 % (ref 0.3–6.2)
ERYTHROCYTE [DISTWIDTH] IN BLOOD BY AUTOMATED COUNT: 15.6 % (ref 12.3–15.4)
HCT VFR BLD AUTO: 27.9 % (ref 34–46.6)
HGB BLD-MCNC: 9.4 G/DL (ref 12–15.9)
LYMPHOCYTES # BLD AUTO: 1.6 10*3/MM3 (ref 0.7–3.1)
LYMPHOCYTES NFR BLD AUTO: 14 % (ref 19.6–45.3)
MCH RBC QN AUTO: 27.3 PG (ref 26.6–33)
MCHC RBC AUTO-ENTMCNC: 33.6 G/DL (ref 31.5–35.7)
MCV RBC AUTO: 81 FL (ref 79–97)
MONOCYTES # BLD AUTO: 0.6 10*3/MM3 (ref 0.1–0.9)
MONOCYTES NFR BLD AUTO: 5 % (ref 5–12)
NEUTROPHILS NFR BLD AUTO: 80.2 % (ref 42.7–76)
NEUTROPHILS NFR BLD AUTO: 9 10*3/MM3 (ref 1.7–7)
NRBC BLD AUTO-RTO: 0 /100 WBC (ref 0–0.2)
PLATELET # BLD AUTO: 242 10*3/MM3 (ref 140–450)
PMV BLD AUTO: 7.8 FL (ref 6–12)
RBC # BLD AUTO: 3.44 10*6/MM3 (ref 3.77–5.28)
WBC # BLD AUTO: 11.2 10*3/MM3 (ref 3.4–10.8)

## 2021-11-08 PROCEDURE — 90471 IMMUNIZATION ADMIN: CPT | Performed by: OBSTETRICS & GYNECOLOGY

## 2021-11-08 PROCEDURE — 90715 TDAP VACCINE 7 YRS/> IM: CPT | Performed by: OBSTETRICS & GYNECOLOGY

## 2021-11-08 PROCEDURE — 85025 COMPLETE CBC W/AUTO DIFF WBC: CPT | Performed by: OBSTETRICS & GYNECOLOGY

## 2021-11-08 PROCEDURE — 25010000002 TETANUS-DIPHTH-ACELL PERTUSSIS 5-2.5-18.5 LF-MCG/0.5 SUSPENSION PREFILLED SYRINGE: Performed by: OBSTETRICS & GYNECOLOGY

## 2021-11-08 RX ORDER — IBUPROFEN 600 MG/1
600 TABLET ORAL EVERY 6 HOURS PRN
Qty: 30 TABLET | Refills: 0 | Status: SHIPPED | OUTPATIENT
Start: 2021-11-08 | End: 2022-02-09 | Stop reason: SDUPTHER

## 2021-11-08 RX ADMIN — IBUPROFEN 600 MG: 600 TABLET, FILM COATED ORAL at 08:21

## 2021-11-08 RX ADMIN — PRENATAL VITAMINS-IRON FUMARATE 27 MG IRON-FOLIC ACID 0.8 MG TABLET 1 TABLET: at 08:21

## 2021-11-08 RX ADMIN — Medication 1 APPLICATION: at 11:01

## 2021-11-08 RX ADMIN — TETANUS TOXOID, REDUCED DIPHTHERIA TOXOID AND ACELLULAR PERTUSSIS VACCINE, ADSORBED 0.5 ML: 5; 2.5; 8; 8; 2.5 SUSPENSION INTRAMUSCULAR at 10:12

## 2021-11-08 RX ADMIN — DOCUSATE SODIUM 100 MG: 100 CAPSULE, LIQUID FILLED ORAL at 08:21

## 2021-11-08 NOTE — DISCHARGE SUMMARY
[facility]  Delivery Discharge Summary    Primary OB Clinician: Misty Barrett MD    Admission Diagnosis: TIUP; IOL for GHTN  Active Problems:    * No active hospital problems. *      Discharge Diagnosis:  delivered    Gestational Age: 37w2d    Date of Delivery: 2021     Delivered By:  Sallie Almanzar     Delivery Type: Vaginal, Spontaneous      Tubal Ligation: n/a    Intrapartum Course: Uncomplicated delivery.     Postpartum Course:  Pt was admitted and underwent  Spontaneous Vaginal Delivery. Pt was transferred to PP where she had an uncomplicated course. Pt remained AFVSS, had scant lochia and pain was well controlled. Pt d/c home on PPD 1 - infant is doing well at this time at [facility] -  in stable condition and will f/u in office for PP visit as scheduled or PRN. Currently both breast and bottle feeding. Plans on condoms  for contraception.     Physical Exam:    Vitals:   Vitals:    21 1500 21 1934 21 2324 21 0700   BP: 117/79 111/77 120/78 97/61   BP Location: Left arm Left arm Right arm Right arm   Patient Position: Lying Lying Lying Lying   Pulse: 97 82 85 84   Resp: 18 18 16 16   Temp: 98.2 °F (36.8 °C) 98.3 °F (36.8 °C) 99.3 °F (37.4 °C) 97.9 °F (36.6 °C)   TempSrc: Oral Oral Oral Oral   SpO2: 98% 98% 99% 98%   Weight:       Height:         Temp (24hrs), Av.5 °F (36.9 °C), Min:97.9 °F (36.6 °C), Max:99.3 °F (37.4 °C)      General Appearance:    Alert, cooperative, in no acute distress   Abdomen:     Soft non-tender, non-distended, no guarding, no rebound         tenderness.   Extremities:   Moves all extremities well, no edema, no cyanosis, no              Redness.   Incision:  N/A   Fundus:   Firm, below umbilicus     Feeding method: Breastfeeding Status: Yes    Labs:  Results from last 7 days   Lab Units 21  0555 21  1034 21  1337   WBC 10*3/mm3 11.20* 16.20* 8.30   HEMOGLOBIN g/dL 9.4* 11.1* 12.8   HEMATOCRIT % 27.9* 32.7* 37.9    PLATELETS 10*3/mm3 242 240 278     Results from last 7 days   Lab Units 11/05/21  1337   GLUCOSE mg/dL 84       Blood Type: RH Positive      Plan:  Discharge to home.    Follow-up appointment with Dr Barrett in 6 weeks.    WILLEM oDwd  11/8/2021  09:38 EST

## 2021-11-08 NOTE — LACTATION NOTE
Mother reports [facility]. She continues to pump q3hr. And getting mist, discussed. Plans for discharge today. Encouraged her to talk with and ask for assist with lactation staff. Provided with our lactation contact card. Encouraged to call as needed.

## 2022-01-19 ENCOUNTER — APPOINTMENT (OUTPATIENT)
Dept: LAB | Facility: HOSPITAL | Age: 22
End: 2022-01-19

## 2022-01-19 ENCOUNTER — TELEPHONE (OUTPATIENT)
Dept: ONCOLOGY | Facility: CLINIC | Age: 22
End: 2022-01-19

## 2022-01-19 NOTE — TELEPHONE ENCOUNTER
Caller: Kelsi Holland    Relationship to patient: Self    Best call back number: [phone]    Chief complaint: NOT FEELING THE BEST TODAY  THROAT IS KIND OF SORE AND KIND OF CONGESTED NEEDING TO R/S LAB AND NEW PT APPT WITH DR MCCAIN TODAY 1/19/22    IF CAN PLEASE CALL [facility] TO WORK OUT THE BEST DAY TO R/S , APPT TIME DEPENDS ON WORK SCHEDULE    Type of visit: NEW PT ONCOLOGY AND LAB     Requested date: PLEASE CALL TO WORK OUT BEST DAY TO R/S     If rescheduling, when is the original appointment:     Additional notes:    WAS WANTING TO R/S OUT FOR A COUPLE OF WEEKS, HUB UNABLE TO R/S WITHIN TIME FRAME.

## 2022-02-01 ENCOUNTER — OFFICE VISIT (OUTPATIENT)
Dept: CARDIOLOGY | Facility: CLINIC | Age: 22
End: 2022-02-01

## 2022-02-01 VITALS
DIASTOLIC BLOOD PRESSURE: 70 MMHG | HEIGHT: 63 IN | OXYGEN SATURATION: 97 % | BODY MASS INDEX: 47.04 KG/M2 | WEIGHT: 265.5 LBS | HEART RATE: 72 BPM | SYSTOLIC BLOOD PRESSURE: 112 MMHG

## 2022-02-01 DIAGNOSIS — I48.0 PAROXYSMAL ATRIAL FIBRILLATION: Primary | ICD-10-CM

## 2022-02-01 PROCEDURE — 99213 OFFICE O/P EST LOW 20 MIN: CPT | Performed by: INTERNAL MEDICINE

## 2022-02-01 RX ORDER — COVID-19 TEST SPECIMEN COLLECT
MISCELLANEOUS MISCELLANEOUS
COMMUNITY
Start: 2022-01-25 | End: 2022-02-09

## 2022-02-01 NOTE — PROGRESS NOTES
HP      Name: Kelsi Holland ADMIT: (Not on file)   : 2000  PCP: Richie Davis Sr., MD    MRN: [id number] LOS: 0 days   AGE/SEX: 21 y.o. female  ROOM: Room/bed info not found     Chief Complaint   Patient presents with   • Palpitations     4 month f/u with holter results       Subjective         History of present illness  Kelsi Holland is a 21-year-old female patient who has palpitations, atrial fibrillation diagnosed in 2021, at that time she was pregnant, gestational hypertension, is here today for follow-up.  Ever since the delivery her palpitations have largely subsided but she still has occasional palpitations.  In fact she has had the symptoms even before she got pregnant.  Her delivery was uneventful and she was not told that she had A. fib during the time she was in the hospital.  Denies any shortness of breath no lower extremity edema no syncopal episodes overall she feels well now.  She does take labetalol as needed.    Past Medical History:   Diagnosis Date   • Allergic    • Asthma    • Gestational hypertension    • Gestational hypertension    • Palpitations    • PCOS (polycystic ovarian syndrome)      No past surgical history on file.  Family History   Problem Relation Age of Onset   • Breast cancer Mother      Social History     Tobacco Use   • Smoking status: Former Smoker     Quit date:      Years since quitting: 10.0   • Smokeless tobacco: Never Used   • Tobacco comment: socially as teenager   Vaping Use   • Vaping Use: Never used   Substance Use Topics   • Alcohol use: Never   • Drug use: Never     (Not in a hospital admission)    Allergies:  Adhesive tape    Review of systems    Constitutional: Negative.    Respiratory and cardiovascular: As detailed in HPI section.  Gastrointestinal: Negative for constipation, nausea and vomiting negative for abdominal distention, abdominal pain and diarrhea.   Genitourinary: Negative for difficulty urinating and flank pain.   Musculoskeletal:  Negative for arthralgias, joint swelling and myalgias.   Skin: Negative for color change, rash and wound.   Neurological: Negative for dizziness, syncope, weakness and headaches.   Hematological: Negative for adenopathy.   Psychiatric/Behavioral: Negative for confusion.   All other systems reviewed and are negative.    Physical Exam  VITALS REVIEWED    General:      well developed, in no acute distress.    Head:      normocephalic and atraumatic.    Eyes:      PERRL/EOM intact, conjunctiva and sclera clear with out nystagmus.    Neck:      no masses, thyromegaly,  trachea central with normal respiratory effort and PMI displaced laterally  Lungs:      Clear to auscultation bilaterally  Heart:       Regular rate and rhythm  Msk:      no deformity or scoliosis noted of thoracic or lumbar spine.    Pulses:      pulses normal in all 4 extremities.    Extremities:       No lower extremity edema  Neurologic:      no focal deficits.   alert oriented x3  Skin:      intact without lesions or rashes.    Psych:      alert and cooperative; normal mood and affect; normal attention span and concentration.      Result Review :                Pertinent cardiac workup    1. EKG 8/24/2021 sinus rhythm  2. Event monitor 9 days and 22 hours starting 9/13/2021.  Revealed episodes of atrial fibrillation with rapid ventricular rates of up to 214 beats per minutes.        Procedures        Assessment and Plan      Kelsi Holland is a 21-year-old female patient with history of palpitations which got more intense during her pregnancy, a Holter monitor which was performed in September while she was pregnant revealed paroxysmal atrial fibrillation with ventricular rates of greater than 200 bpm.  At that time the patient was prescribed labetalol.  Her delivery was uneventful however she still complaining of palpitations albeit not as severe.  Of note that she had severe reaction to the tape when she was wearing the monitor and that is why she had  to turn it in sooner.  I would like to start her on a low-dose aspirin for stroke prevention but also I would like to monitor to see if she has any recurrence of atrial fibrillation.  Since she cannot wear the external monitor due to tape allergy and the need for longer monitoring duration, she would benefit more from a loop recorder.  Patient is agreeable and would like to proceed.  If A. fib is identified on the loop recorder then we need to talk to her about antiarrhythmic therapy or even ablation if necessary.    Diagnoses and all orders for this visit:    1. Paroxysmal atrial fibrillation (HCC) (Primary)  -     Loop Recorder Implant - Treatment Room; Future  -     COVID PRE-OP / PRE-PROCEDURE SCREENING ORDER (NO ISOLATION) - Swab, Nasopharynx; Future           No follow-ups on file.  Patient was given instructions and counseling regarding her condition or for health maintenance advice. Please see specific information pulled into the AVS if appropriate.

## 2022-02-08 ENCOUNTER — APPOINTMENT (OUTPATIENT)
Dept: LAB | Facility: HOSPITAL | Age: 22
End: 2022-02-08

## 2022-02-08 ENCOUNTER — TELEPHONE (OUTPATIENT)
Dept: ONCOLOGY | Facility: CLINIC | Age: 22
End: 2022-02-08

## 2022-02-08 PROBLEM — N92.6 IRREGULAR MENSES: Status: ACTIVE | Noted: 2022-02-08

## 2022-02-08 PROBLEM — Z80.3 FAMILY HISTORY OF BREAST CANCER: Status: ACTIVE | Noted: 2022-02-08

## 2022-02-08 PROBLEM — R30.0 DIFFICULT OR PAINFUL URINATION: Status: ACTIVE | Noted: 2022-02-08

## 2022-02-08 NOTE — TELEPHONE ENCOUNTER
Caller: [facility]    Relationship to patient: SELF    Best call back number: [phone]    Patient is needing: TO CANCEL 2-8-2022 NP APPT. PT CALLED BECAUSE SHE IS NOT FEELING WELL. HUB TRIED TO WARM TRANSFER BUT WAS UNSUCCESSFUL. [facility] LVM WITH [facility] TO CALL PT BACK.

## 2022-02-08 NOTE — TELEPHONE ENCOUNTER
Hub is instructed to read the documentation below to patient  PATIENT CANCELLED 2-8-22 NEW PT APPT.  ATTEMPTED TO CONTACT PATIENT TO RESCHEDULE APPT.  LMV ASKING HER TO CALL BACK.

## 2022-02-09 ENCOUNTER — OFFICE VISIT (OUTPATIENT)
Dept: FAMILY MEDICINE CLINIC | Facility: CLINIC | Age: 22
End: 2022-02-09

## 2022-02-09 VITALS
HEART RATE: 109 BPM | DIASTOLIC BLOOD PRESSURE: 60 MMHG | SYSTOLIC BLOOD PRESSURE: 118 MMHG | OXYGEN SATURATION: 99 % | WEIGHT: 265 LBS | RESPIRATION RATE: 16 BRPM | HEIGHT: 63 IN | TEMPERATURE: 97.8 F | BODY MASS INDEX: 46.95 KG/M2

## 2022-02-09 DIAGNOSIS — G44.311 INTRACTABLE ACUTE POST-TRAUMATIC HEADACHE: ICD-10-CM

## 2022-02-09 DIAGNOSIS — M54.2 NECK PAIN: Primary | ICD-10-CM

## 2022-02-09 PROCEDURE — 99213 OFFICE O/P EST LOW 20 MIN: CPT | Performed by: FAMILY MEDICINE

## 2022-02-09 RX ORDER — IBUPROFEN 600 MG/1
600 TABLET ORAL EVERY 6 HOURS PRN
Qty: 90 TABLET | Refills: 2 | Status: SHIPPED | OUTPATIENT
Start: 2022-02-09
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
## 2022-02-18 ENCOUNTER — LAB (OUTPATIENT)
Dept: LAB | Facility: HOSPITAL | Age: 22
End: 2022-02-18

## 2022-02-18 ENCOUNTER — HOSPITAL ENCOUNTER (OUTPATIENT)
Dept: CARDIOLOGY | Facility: HOSPITAL | Age: 22
Discharge: HOME | End: 2022-02-18

## 2022-02-18 VITALS
BODY MASS INDEX: 47.27 KG/M2 | SYSTOLIC BLOOD PRESSURE: 150 MMHG | HEIGHT: 63 IN | WEIGHT: 266.76 LBS | OXYGEN SATURATION: 99 % | DIASTOLIC BLOOD PRESSURE: 69 MMHG | TEMPERATURE: 97.9 F | HEART RATE: 80 BPM | RESPIRATION RATE: 20 BRPM

## 2022-02-18 DIAGNOSIS — I48.0 PAROXYSMAL ATRIAL FIBRILLATION: ICD-10-CM

## 2022-02-18 DIAGNOSIS — R00.2 HEART PALPITATIONS: ICD-10-CM

## 2022-02-18 LAB
MAXIMAL PREDICTED HEART RATE: 197 BPM
SARS-COV-2 RNA PNL SPEC NAA+PROBE: NOT DETECTED
STRESS TARGET HR: 167 BPM

## 2022-02-18 PROCEDURE — C9803 HOPD COVID-19 SPEC COLLECT: HCPCS

## 2022-02-18 PROCEDURE — 33285 INSJ SUBQ CAR RHYTHM MNTR: CPT | Performed by: INTERNAL MEDICINE

## 2022-02-18 PROCEDURE — C1764 EVENT RECORDER, CARDIAC: HCPCS

## 2022-02-18 PROCEDURE — 33285 INSJ SUBQ CAR RHYTHM MNTR: CPT

## 2022-02-18 PROCEDURE — U0003 INFECTIOUS AGENT DETECTION BY NUCLEIC ACID (DNA OR RNA); SEVERE ACUTE RESPIRATORY SYNDROME CORONAVIRUS 2 (SARS-COV-2) (CORONAVIRUS DISEASE [COVID-19]), AMPLIFIED PROBE TECHNIQUE, MAKING USE OF HIGH THROUGHPUT TECHNOLOGIES AS DESCRIBED BY CMS-2020-01-R: HCPCS

## 2022-02-18 RX ORDER — LIDOCAINE HYDROCHLORIDE AND EPINEPHRINE BITARTRATE 20; .01 MG/ML; MG/ML
INJECTION, SOLUTION SUBCUTANEOUS
Status: COMPLETED | OUTPATIENT
Start: 2022-02-18 | End: 2022-02-18

## 2022-02-18 RX ADMIN — LIDOCAINE HYDROCHLORIDE,EPINEPHRINE BITARTRATE 10 ML: 20; .01 INJECTION, SOLUTION INFILTRATION; PERINEURAL at 08:28

## 2022-02-18 NOTE — DISCHARGE INSTRUCTIONS
Implantable Loop Recorder Placement, Care After  This sheet gives you information about how to care for yourself after your procedure. Your health care provider may also give you more specific instructions. If you have problems or questions, contact your health care provider.  What can I expect after the procedure?  After the procedure, it is common to have:  · Soreness or discomfort near the incision.  · Some swelling or bruising near the incision.  Follow these instructions at home:  Incision care    · Follow instructions from your health care provider about how to take care of your incision. Make sure you:  ? Wash your hands with soap and water before you change your bandage (dressing). If soap and water are not available, use hand .  ? Change your dressing as told by your health care provider.  ? Keep your dressing dry.  ? Leave stitches (sutures), skin glue, or adhesive strips in place. These skin closures may need to stay in place for 2 weeks or longer. If adhesive strip edges start to loosen and curl up, you may trim the loose edges. Do not remove adhesive strips completely unless your health care provider tells you to do that.  · Check your incision area every day for signs of infection. Check for:  ? Redness, swelling, or pain.  ? Fluid or blood.  ? Warmth.  ? Pus or a bad smell.  · Do not take baths, swim, or use a hot tub until your health care provider approves. Ask your health care provider if you can take showers.    Activity    · Return to your normal activities as told by your health care provider. Ask your health care provider what activities are safe for you.  · Do not drive for 24 hours if you were given a sedative during your procedure.    General instructions  · Follow instructions from your health care provider about how to manage your implantable loop recorder and transmit the information. Learn how to activate a recording if this is necessary for your type of device.  · Do not go  through a metal detection gate, and do not let someone hold a metal detector over your chest. Show your ID card.  · Do not have an MRI unless you check with your health care provider first.  · Take over-the-counter and prescription medicines only as told by your health care provider.  · Keep all follow-up visits as told by your health care provider. This is important.  Contact a health care provider if:  · You have redness, swelling, or pain around your incision.  · You have a fever.  · You have pain that is not relieved by your pain medicine.  · You have triggered your device because of fainting (syncope) or because of a heartbeat that feels like it is racing, slow, fluttering, or skipping (palpitations).  Get help right away if you have:  · Chest pain.  · Difficulty breathing.  Summary  · After the procedure, it is common to have soreness or discomfort near the incision.  · Change your dressing as told by your health care provider.  · Follow instructions from your health care provider about how to manage your implantable loop recorder and transmit the information.  · Keep all follow-up visits as told by your health care provider. This is important.  This information is not intended to replace advice given to you by your health care provider. Make sure you discuss any questions you have with your health care provider.  Document Revised: 02/02/2019 Document Reviewed: 02/02/2019  [employer] Patient Education © 2021 [employer]

## 2022-02-18 NOTE — DISCHARGE INSTR - ACTIVITY
Follow up for wound check with Dr. Dyson nurse (Dest[place]) in Dr. Dyson Office in 2 weeks [phone].    No showering for 2 days.  Try to keep site dry when showering until seen in office.
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
## 2022-03-21 PROCEDURE — G2066 INTER DEVC REMOTE 30D: HCPCS | Performed by: INTERNAL MEDICINE

## 2022-03-21 PROCEDURE — 93298 REM INTERROG DEV EVAL SCRMS: CPT | Performed by: INTERNAL MEDICINE

## 2022-03-30 DIAGNOSIS — J45.21 MILD INTERMITTENT ASTHMA WITH ACUTE EXACERBATION: ICD-10-CM

## 2022-03-30 DIAGNOSIS — J30.1 SEASONAL ALLERGIC RHINITIS DUE TO POLLEN: Primary | ICD-10-CM

## 2022-03-30 RX ORDER — ALBUTEROL SULFATE 90 UG/1
2 AEROSOL, METERED RESPIRATORY (INHALATION) EVERY 4 HOURS PRN
Qty: 18 G | Refills: 0 | Status: SHIPPED | OUTPATIENT
Start: 2022-03-30

## 2022-03-30 RX ORDER — MONTELUKAST SODIUM 10 MG/1
10 TABLET ORAL NIGHTLY
Qty: 30 TABLET | Refills: 3 | Status: SHIPPED | OUTPATIENT
Start: 2022-03-30

## 2022-05-22 PROCEDURE — G2066 INTER DEVC REMOTE 30D: HCPCS | Performed by: INTERNAL MEDICINE

## 2022-05-22 PROCEDURE — 93298 REM INTERROG DEV EVAL SCRMS: CPT | Performed by: INTERNAL MEDICINE

## 2022-06-14 ENCOUNTER — OFFICE VISIT (OUTPATIENT)
Dept: CARDIOLOGY | Facility: CLINIC | Age: 22
End: 2022-06-14

## 2022-06-14 VITALS
SYSTOLIC BLOOD PRESSURE: 106 MMHG | DIASTOLIC BLOOD PRESSURE: 72 MMHG | WEIGHT: 277 LBS | BODY MASS INDEX: 49.08 KG/M2 | HEART RATE: 90 BPM | HEIGHT: 63 IN | OXYGEN SATURATION: 97 %

## 2022-06-14 DIAGNOSIS — E66.01 MORBID (SEVERE) OBESITY DUE TO EXCESS CALORIES: ICD-10-CM

## 2022-06-14 DIAGNOSIS — I48.0 PAROXYSMAL ATRIAL FIBRILLATION: Primary | ICD-10-CM

## 2022-06-14 PROCEDURE — 99213 OFFICE O/P EST LOW 20 MIN: CPT | Performed by: INTERNAL MEDICINE

## 2022-06-14 PROCEDURE — 93000 ELECTROCARDIOGRAM COMPLETE: CPT | Performed by: INTERNAL MEDICINE

## 2022-06-14 NOTE — PROGRESS NOTES
Progress note      Name: Kelsi Holland ADMIT: (Not on file)   : 2000  PCP: Richie Davis Sr., MD    MRN: [id number] LOS: 0 days   AGE/SEX: 21 y.o. female  ROOM: Room/bed info not found     Chief Complaint   Patient presents with   • Follow-up     4 month       Subjective       History of present illness  Kelsi Holland  is a 21-year-old female patient who has palpitations, atrial fibrillation diagnosed in 2021, at that time she was pregnant, gestational hypertension, is here today for follow-up.  Ever since the delivery her palpitations have largely subsided but she still has occasional palpitations.  In fact she has had the symptoms even before she got pregnant.  Here lately the patient has not been experiencing any significant palpitations, she denies any syncopal episodes or chest pain.  She has a loop recorder in place.    Past Medical History:   Diagnosis Date   • Allergic    • Asthma    • Gestational hypertension    • Gestational hypertension    • Palpitations    • PCOS (polycystic ovarian syndrome)      History reviewed. No pertinent surgical history.  Family History   Problem Relation Age of Onset   • Breast cancer Mother      Social History     Tobacco Use   • Smoking status: Former Smoker     Quit date:      Years since quitting: 104   • Smokeless tobacco: Never Used   • Tobacco comment: socially as teenager   Vaping Use   • Vaping Use: Never used   Substance Use Topics   • Alcohol use: Never   • Drug use: Never     (Not in a hospital admission)    Allergies:  Adhesive tape      Physical Exam  VITALS REVIEWED    General:      well developed, in no acute distress.    Head:      normocephalic and atraumatic.    Eyes:      PERRL/EOM intact, conjunctiva and sclera clear with out nystagmus.    Neck:      no masses, thyromegaly,  trachea central with normal respiratory effort and PMI displaced laterally  Lungs:      Clear to auscultation bilaterally  Heart:       regular rate and rhythm  Msk:       no deformity or scoliosis noted of thoracic or lumbar spine.   Pulses:      pulses normal in all 4 extremities.    Extremities:       no lower extremity edema  Neurologic:      no focal deficits.   alert oriented x3  Skin:      intact without lesions or rashes.    Psych:      alert and cooperative; normal mood and affect; normal attention span and concentration.      Result Review :               Pertinent cardiac workup    1. EKG 8/24/2021 sinus rhythm  2. Event monitor 9 days and 22 hours starting 9/13/2021.  Revealed episodes of atrial fibrillation with rapid ventricular rates of up to 214 beats per minutes.  Longest episode lasting 14 hours        ECG 12 Lead    Date/Time: 6/14/2022 2:42 PM  Performed by: Lissette Cruz MD  Authorized by: Lissette Cruz MD   Comparison: compared with previous ECG   Similar to previous ECG  Rhythm: sinus rhythm  Rate: normal  BPM: 73  Conduction: conduction normal  ST Segments: ST segments normal  T Waves: T waves normal  QRS axis: normal                  Assessment and Plan      Kelsi Holland is a 21-year-old female patient who has paroxysmal atrial fibrillation so far 1 episode in September 2021 which lasted for 14 hours.  Her heart rate however was extremely high greater than 200.  Since her delivery patient has not had any significant palpitations.  She has a loop recorder in place which did not reveal any A. fib since its implant in February 2022.  For now we will continue to monitor her, advised the patient to work on losing weight which will help with atrial fibrillation, also advised her to consider getting tested for sleep apnea.  We will see her in follow-up in 6 months.    Diagnoses and all orders for this visit:    1. Paroxysmal atrial fibrillation (HCC) (Primary)    2. Morbid (severe) obesity due to excess calories (HCC)           Return in about 6 months (around 12/14/2022).  Patient was given instructions and counseling regarding her condition or for  health maintenance advice. Please see specific information pulled into the AVS if appropriate.

## 2022-08-23 PROCEDURE — 93298 REM INTERROG DEV EVAL SCRMS: CPT | Performed by: INTERNAL MEDICINE

## 2022-08-23 PROCEDURE — G2066 INTER DEVC REMOTE 30D: HCPCS | Performed by: INTERNAL MEDICINE

## 2022-10-24 PROCEDURE — G2066 INTER DEVC REMOTE 30D: HCPCS | Performed by: INTERNAL MEDICINE

## 2022-10-24 PROCEDURE — 93298 REM INTERROG DEV EVAL SCRMS: CPT | Performed by: INTERNAL MEDICINE

## 2022-11-17 ENCOUNTER — OFFICE VISIT (OUTPATIENT)
Dept: FAMILY MEDICINE CLINIC | Facility: CLINIC | Age: 22
End: 2022-11-17

## 2022-11-17 VITALS
OXYGEN SATURATION: 98 % | RESPIRATION RATE: 18 BRPM | SYSTOLIC BLOOD PRESSURE: 130 MMHG | DIASTOLIC BLOOD PRESSURE: 82 MMHG | TEMPERATURE: 97.1 F | WEIGHT: 267 LBS | HEART RATE: 79 BPM | BODY MASS INDEX: 45.58 KG/M2 | HEIGHT: 64 IN

## 2022-11-17 DIAGNOSIS — Z00.00 ANNUAL PHYSICAL EXAM: Primary | ICD-10-CM

## 2022-11-17 DIAGNOSIS — F98.8 ATTENTION DEFICIT DISORDER (ADD) IN ADULT: ICD-10-CM

## 2022-11-17 DIAGNOSIS — E66.01 MORBID (SEVERE) OBESITY DUE TO EXCESS CALORIES: ICD-10-CM

## 2022-11-17 DIAGNOSIS — F41.9 ANXIETY: ICD-10-CM

## 2022-11-17 PROCEDURE — 3008F BODY MASS INDEX DOCD: CPT | Performed by: FAMILY MEDICINE

## 2022-11-17 PROCEDURE — 99395 PREV VISIT EST AGE 18-39: CPT | Performed by: FAMILY MEDICINE

## 2022-11-17 PROCEDURE — 2014F MENTAL STATUS ASSESS: CPT | Performed by: FAMILY MEDICINE

## 2022-11-17 RX ORDER — BUPROPION HYDROCHLORIDE 150 MG/1
150 TABLET ORAL DAILY
Qty: 30 TABLET | Refills: 12 | Status: SHIPPED | OUTPATIENT
Start: 2022-11-17

## 2022-11-17 NOTE — PROGRESS NOTES
"Chief Complaint  Chief Complaint   Patient presents with   • Establish Care     Former    • Annual Exam     Physical    • Anxiety     Pt c/o anxiety getting worse      Subjective    History of Present Illness        Kelsi Holland presents to [facility].  Kelsi Holland is a 22 y.o. female here for her annual physical with me. Kelsi is here for coordination of medical care, to discuss health maintenance, disease prevention as well as to followup on medical problems. Patient has been followed by me since 2016. Patient's last CPE was 02/10/2021. Activity level is minimal. Exercises 0 per week. Appetite is fair. Feels well with few complaints. Energy level is poor. Sleeps fairly well.     History of Present Illness  Anxiety  The patient shares that she feels that she has anxiety because death is consistently on her mind. She states she always feels like family and friends have  whenever they leave to go to work or for an appointment. The patient also reports that she used to love being around her family, but now she despises being around it.     Weight loss  The patient shares that she wants to lose weight, but finds it difficult to do so.              Objective   Vital Signs:   Visit Vitals  /82   Pulse 79   Temp 97.1 °F (36.2 °C)   Resp 18   Ht 162.6 cm (64\")   Wt 121 kg (267 lb)   SpO2 98%   BMI 45.83 kg/m²        Physical Exam  Vitals reviewed.   Constitutional:       Appearance: She is well-developed.   HENT:      Head: Normocephalic and atraumatic.      Nose: Nose normal.   Eyes:      General: Lids are normal.      Conjunctiva/sclera: Conjunctivae normal.      Pupils: Pupils are equal, round, and reactive to light.   Cardiovascular:      Rate and Rhythm: Normal rate and regular rhythm.      Pulses: Normal pulses.      Heart sounds: Normal heart sounds.   Pulmonary:      Effort: Pulmonary effort is normal. No respiratory distress.      Breath sounds: Normal breath sounds. "   Abdominal:      General: Bowel sounds are normal.      Palpations: Abdomen is soft.   Musculoskeletal:         General: Normal range of motion.      Cervical back: Normal range of motion and neck supple.   Skin:     General: Skin is warm and dry.      Capillary Refill: Capillary refill takes less than 2 seconds.   Neurological:      Mental Status: She is alert and oriented to person, place, and time.   Psychiatric:         Behavior: Behavior normal.         Thought Content: Thought content normal.         Judgment: Judgment normal.            Result Review :                    Assessment and Plan      Diagnoses and all orders for this visit:    1. Annual physical exam (Primary)  Assessment & Plan:  Discussed injury prevention, diet and exercise, safe sexual practices, and screening for common diseases. Encouraged use of sunscreen and seatbelts. Discussed timing of  cervical cancer screening. Encouraged monthly self-breast exams, yearly clinical breast exams, and discussed timing of mammograms. Avoidance of tobacco encouraged. Limitation or avoidance of alcohol encouraged. Recommend yearly dental and eye exams. Also discussed monitoring of blood pressure, lipids.    Orders:  -     CBC & Differential  -     Comprehensive Metabolic Panel  -     Lipid Panel With / Chol / HDL Ratio  -     TSH    2. Anxiety  Assessment & Plan:  Pt was started on Wellbutrin to treat her symptoms.  Patient was encouraged to return to clinic in 1 month for follow-up.    Orders:  -     buPROPion XL (WELLBUTRIN XL) 150 MG 24 hr tablet; Take 1 tablet by mouth Daily.  Dispense: 30 tablet; Refill: 12    3. Attention deficit disorder (ADD) in adult  Assessment & Plan:  Psychological condition is worsening.  Medication changes per orders.  Psychological condition  will be reassessed in 4 weeks.  Patient was for started on Wellbutrin to help with her symptoms.      4. Morbid (severe) obesity due to excess calories (HCC)  Comments:  Patient will be  referred to a nutritionist and advised to control portion sizes.   Orders:  -     Ambulatory Referral to Nutrition Services           Follow Up   No follow-ups on file.  Patient was given instructions and counseling regarding her condition or for health maintenance advice. Please see specific information pulled into the AVS if appropriate.   Transcribed from ambient dictation for Richie Davis Sr, MD by Samara Kellogg.  11/17/22   11:50 EST    Patient or patient representative verbalized consent to the visit recording.  I have personally performed the services described in this document as transcribed by the above individual, and it is both accurate and complete.

## 2022-11-18 ENCOUNTER — TELEPHONE (OUTPATIENT)
Dept: FAMILY MEDICINE CLINIC | Facility: CLINIC | Age: 22
End: 2022-11-18

## 2022-11-18 LAB
ALBUMIN SERPL-MCNC: 4.2 G/DL (ref 3.5–5.2)
ALBUMIN/GLOB SERPL: 1.6 G/DL
ALP SERPL-CCNC: 99 U/L (ref 39–117)
ALT SERPL-CCNC: 30 U/L (ref 1–33)
AST SERPL-CCNC: 23 U/L (ref 1–32)
BASOPHILS # BLD AUTO: 0.05 10*3/MM3 (ref 0–0.2)
BASOPHILS NFR BLD AUTO: 0.8 % (ref 0–1.5)
BILIRUB SERPL-MCNC: 0.3 MG/DL (ref 0–1.2)
BUN SERPL-MCNC: 15 MG/DL (ref 6–20)
BUN/CREAT SERPL: 20.8 (ref 7–25)
CALCIUM SERPL-MCNC: 10 MG/DL (ref 8.6–10.5)
CHLORIDE SERPL-SCNC: 106 MMOL/L (ref 98–107)
CHOLEST SERPL-MCNC: 133 MG/DL (ref 0–200)
CHOLEST/HDLC SERPL: 3.02 {RATIO}
CO2 SERPL-SCNC: 26.5 MMOL/L (ref 22–29)
CREAT SERPL-MCNC: 0.72 MG/DL (ref 0.57–1)
EGFRCR SERPLBLD CKD-EPI 2021: 119.9 ML/MIN/1.73
EOSINOPHIL # BLD AUTO: 0.1 10*3/MM3 (ref 0–0.4)
EOSINOPHIL NFR BLD AUTO: 1.6 % (ref 0.3–6.2)
ERYTHROCYTE [DISTWIDTH] IN BLOOD BY AUTOMATED COUNT: 14.2 % (ref 12.3–15.4)
GLOBULIN SER CALC-MCNC: 2.6 GM/DL
GLUCOSE SERPL-MCNC: 89 MG/DL (ref 65–99)
HCT VFR BLD AUTO: 40.2 % (ref 34–46.6)
HDLC SERPL-MCNC: 44 MG/DL (ref 40–60)
HGB BLD-MCNC: 13.1 G/DL (ref 12–15.9)
IMM GRANULOCYTES # BLD AUTO: 0.01 10*3/MM3 (ref 0–0.05)
IMM GRANULOCYTES NFR BLD AUTO: 0.2 % (ref 0–0.5)
LDLC SERPL CALC-MCNC: 72 MG/DL (ref 0–100)
LYMPHOCYTES # BLD AUTO: 1.62 10*3/MM3 (ref 0.7–3.1)
LYMPHOCYTES NFR BLD AUTO: 25.3 % (ref 19.6–45.3)
MCH RBC QN AUTO: 24.5 PG (ref 26.6–33)
MCHC RBC AUTO-ENTMCNC: 32.6 G/DL (ref 31.5–35.7)
MCV RBC AUTO: 75.1 FL (ref 79–97)
MONOCYTES # BLD AUTO: 0.51 10*3/MM3 (ref 0.1–0.9)
MONOCYTES NFR BLD AUTO: 8 % (ref 5–12)
NEUTROPHILS # BLD AUTO: 4.12 10*3/MM3 (ref 1.7–7)
NEUTROPHILS NFR BLD AUTO: 64.1 % (ref 42.7–76)
NRBC BLD AUTO-RTO: 0.2 /100 WBC (ref 0–0.2)
PLATELET # BLD AUTO: 313 10*3/MM3 (ref 140–450)
POTASSIUM SERPL-SCNC: 4.8 MMOL/L (ref 3.5–5.2)
PROT SERPL-MCNC: 6.8 G/DL (ref 6–8.5)
RBC # BLD AUTO: 5.35 10*6/MM3 (ref 3.77–5.28)
SODIUM SERPL-SCNC: 141 MMOL/L (ref 136–145)
TRIGL SERPL-MCNC: 88 MG/DL (ref 0–150)
TSH SERPL DL<=0.005 MIU/L-ACNC: 1.93 UIU/ML (ref 0.27–4.2)
VLDLC SERPL CALC-MCNC: 17 MG/DL (ref 5–40)
WBC # BLD AUTO: 6.41 10*3/MM3 (ref 3.4–10.8)

## 2022-11-18 NOTE — TELEPHONE ENCOUNTER
Patient has [place] [employer] Medicaid.The office does not participate  in this insurance plan. Patient is aware and will reach out to her insurance to see if it can be changed. She will need to find a new pcp who accepts her insurance.

## 2022-11-27 PROBLEM — F98.8 ATTENTION DEFICIT DISORDER (ADD) IN ADULT: Status: ACTIVE | Noted: 2022-11-27

## 2022-11-27 PROBLEM — F41.9 ANXIETY: Status: ACTIVE | Noted: 2022-11-27

## 2022-11-27 NOTE — ASSESSMENT & PLAN NOTE
Pt was started on Wellbutrin to treat her symptoms.  Patient was encouraged to return to clinic in 1 month for follow-up.

## 2022-11-27 NOTE — ASSESSMENT & PLAN NOTE
Psychological condition is worsening.  Medication changes per orders.  Psychological condition  will be reassessed in 4 weeks.  Patient was for started on Wellbutrin to help with her symptoms.

## 2022-12-28 ENCOUNTER — OFFICE VISIT (OUTPATIENT)
Dept: CARDIOLOGY | Facility: CLINIC | Age: 22
End: 2022-12-28

## 2022-12-28 VITALS
BODY MASS INDEX: 45.93 KG/M2 | WEIGHT: 269 LBS | HEART RATE: 58 BPM | DIASTOLIC BLOOD PRESSURE: 82 MMHG | SYSTOLIC BLOOD PRESSURE: 122 MMHG | OXYGEN SATURATION: 98 % | HEIGHT: 64 IN

## 2022-12-28 DIAGNOSIS — O16.3 ELEVATED BLOOD PRESSURE AFFECTING PREGNANCY IN THIRD TRIMESTER, ANTEPARTUM: ICD-10-CM

## 2022-12-28 DIAGNOSIS — I48.0 PAROXYSMAL ATRIAL FIBRILLATION: Primary | ICD-10-CM

## 2022-12-28 PROCEDURE — 93000 ELECTROCARDIOGRAM COMPLETE: CPT | Performed by: INTERNAL MEDICINE

## 2022-12-28 PROCEDURE — 99213 OFFICE O/P EST LOW 20 MIN: CPT | Performed by: INTERNAL MEDICINE

## 2022-12-28 NOTE — PROGRESS NOTES
Progress note      Name: Kelsi Hua ADMIT: (Not on file)   : 2000  PCP: Yuliet Crabtree MD    MRN: [id number] LOS: 0 days   AGE/SEX: 22 y.o. female  ROOM: Room/bed info not found     Chief Complaint   Patient presents with   • Atrial Fibrillation       Subjective       History of present illness  Kelsi Hua is a 22-year-old female patient who has palpitations, atrial fibrillation diagnosed in 2021, at that time she was pregnant, gestational hypertension, is here today for follow-up.  Ever since the delivery her palpitations have largely subsided, she denies any palpitations, she has stopped taking her labetalol.  Overall she feels well.    Past Medical History:   Diagnosis Date   • Allergic    • Asthma    • Gestational hypertension    • Gestational hypertension    • Palpitations    • PCOS (polycystic ovarian syndrome)      History reviewed. No pertinent surgical history.  Family History   Problem Relation Age of Onset   • Breast cancer Mother      Social History     Tobacco Use   • Smoking status: Former     Types: Cigarettes     Quit date:      Years since quitting: 10.9     Passive exposure: Never   • Smokeless tobacco: Never   • Tobacco comments:     socially as teenager   Vaping Use   • Vaping Use: Never used   Substance Use Topics   • Alcohol use: Never   • Drug use: Never     (Not in a hospital admission)    Allergies:  Adhesive tape      Physical Exam  VITALS REVIEWED    General:      well developed, in no acute distress.    Head:      normocephalic and atraumatic.    Eyes:      PERRL/EOM intact, conjunctiva and sclera clear with out nystagmus.    Neck:      no masses, thyromegaly,  trachea central with normal respiratory effort and PMI displaced laterally  Lungs:      Clear to auscultation bilaterally  Heart:       Regular rate and rhythm  Msk:      no deformity or scoliosis noted of thoracic or lumbar spine.    Pulses:      pulses normal in all 4 extremities.    Extremities:        No lower extremity edema  Neurologic:      no focal deficits.   alert oriented x3  Skin:      intact without lesions or rashes.    Psych:      alert and cooperative; normal mood and affect; normal attention span and concentration.      Result Review :               Pertinent cardiac workup    1. EKG 8/24/2021 sinus rhythm  2. Event monitor 9 days and 22 hours starting 9/13/2021.  Revealed episodes of atrial fibrillation with rapid ventricular rates of up to 214 beats per minutes.  Longest episode lasting 14 hours        ECG 12 Lead    Date/Time: 12/28/2022 12:04 PM  Performed by: Lissette Cruz MD  Authorized by: Lissette Cruz MD   Comparison: compared with previous ECG   Similar to previous ECG  Rhythm: sinus rhythm  Rate: normal  BPM: 63  Conduction: conduction normal  ST Segments: ST segments normal  T Waves: T waves normal  QRS axis: normal                  Assessment and Plan      Kelsi Hua is a 22-year-old female patient who has paroxysmal atrial fibrillation, so far she has had 1 episode in September 2021 which lasted for 14 hours, her heart rate was extremely fast, up to 200 bpm.  At the time she was pregnant.  Since delivery however she has not had any palpitations.  In fact she has a loop recorder which did not show any atrial fibrillation since its implant in February 2022.  Her EKG today shows sinus rhythm.  For now no specific therapy is needed, I will see her in follow-up in 1 year or sooner if she has any recurrence of A. fib.  I did discuss to her about smart watches that do EKGs which be a good tool to have in her case.    Diagnoses and all orders for this visit:    1. Paroxysmal atrial fibrillation (HCC) (Primary)    2. Elevated blood pressure affecting pregnancy in third trimester, antepartum           Return in about 1 year (around 12/28/2023).  Patient was given instructions and counseling regarding her condition or for health maintenance advice. Please see specific information  pulled into the AVS if appropriate.

## 2023-01-25 PROCEDURE — 93298 REM INTERROG DEV EVAL SCRMS: CPT | Performed by: INTERNAL MEDICINE

## 2023-01-25 PROCEDURE — G2066 INTER DEVC REMOTE 30D: HCPCS | Performed by: INTERNAL MEDICINE

## 2023-01-30 ENCOUNTER — TELEPHONE (OUTPATIENT)
Dept: CARDIOLOGY | Facility: CLINIC | Age: 23
End: 2023-01-30
Payer: MEDICAID

## 2023-01-30 ENCOUNTER — PATIENT MESSAGE (OUTPATIENT)
Dept: CARDIOLOGY | Facility: CLINIC | Age: 23
End: 2023-01-30
Payer: MEDICAID

## 2023-01-30 NOTE — TELEPHONE ENCOUNTER
Will advise info to patient she also sent another message stating that has had 2-3 nose bleeds last week as well. I sent a message to pt to [facility]. Awaiting response if needs new Rx.     My new PCP has been rescheduled for March 30th. I got a call that the doctor will not be in for my appointment next week. Would you mind to pass that along. If I need to I can schedule an appointment with Dr. Cruz at a sooner date.     Pt sent 2nd message. Please advise.

## 2023-01-30 NOTE — TELEPHONE ENCOUNTER
----- Message from Kelsi Hua sent at 1/30/2023  2:28 PM EST -----  Regarding: Blood pressure  Contact: [phone]  Hello! 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 I had my blood pressure taken 3 times in a row due to it being very high. The highest was 160/120. I wonder if this could be stress related. Last week my blood pressure was running 130's-140's/100-110. Can you send me a prescription for some blood pressure medication? Or can you tell me what medicine I can take over the counter? I do not see my new PCP until next week to be able to address this. I also do not want to go to the hospital.

## 2023-01-30 NOTE — TELEPHONE ENCOUNTER
High blood pressure could be due to stress.  It can also be due to repeated blood pressures in the same arm multiple times.  Restart labetalol 50 mg twice daily.  Keep BP log for the next week until PCP appointment.  Let us know if you need a new prescription.

## 2023-01-31 NOTE — TELEPHONE ENCOUNTER
Pt responded via my chart. Please advise.     Yes please send the order to [facility] pharmacy in [place], [place]. I do have some propranolol from being on it before for palpitations. I'm on some medicine for anxiety but read that propranolol can also help with anxiety. So I didn't take the propranolol yesterday because I already took my anxiety medication. To clarify, i was on propranolol while pregnant for heart palpitations, I was switched to lebetalol due to being pregnant. Could I just get a new prescription for propranolol so I can just use it for BP and anxiety? Instead of having two different medications?

## 2023-01-31 NOTE — TELEPHONE ENCOUNTER
Keep BP log for 1 week while taking labetalol. We will follow up at that time. Call the office if still having high BP's or symptoms.

## 2023-01-31 NOTE — TELEPHONE ENCOUNTER
Propanolol can help some people with anxiety, but it won't provide the help you are looking for with your anxiety. I feel the high blood pressures are anxiety related, especially with being in nursing school. Keep a BP log for 5-7 days. If you continue to have high BP, we can start something at that time. In the meantime, try using stress-relieving techniques.

## 2023-05-25 ENCOUNTER — PATIENT ROUNDING (BHMG ONLY) (OUTPATIENT)
Dept: FAMILY MEDICINE CLINIC | Facility: CLINIC | Age: 23
End: 2023-05-25
Payer: MEDICAID

## 2023-05-25 ENCOUNTER — OFFICE VISIT (OUTPATIENT)
Dept: FAMILY MEDICINE CLINIC | Facility: CLINIC | Age: 23
End: 2023-05-25
Payer: MEDICAID

## 2023-05-25 VITALS
HEART RATE: 88 BPM | DIASTOLIC BLOOD PRESSURE: 80 MMHG | TEMPERATURE: 97.4 F | WEIGHT: 276 LBS | RESPIRATION RATE: 16 BRPM | BODY MASS INDEX: 47.12 KG/M2 | SYSTOLIC BLOOD PRESSURE: 115 MMHG | HEIGHT: 64 IN | OXYGEN SATURATION: 96 %

## 2023-05-25 DIAGNOSIS — F51.01 PRIMARY INSOMNIA: ICD-10-CM

## 2023-05-25 DIAGNOSIS — R00.2 PALPITATIONS: Primary | ICD-10-CM

## 2023-05-25 DIAGNOSIS — Z13.1 SCREENING FOR DIABETES MELLITUS: ICD-10-CM

## 2023-05-25 DIAGNOSIS — Z13.29 SCREENING FOR THYROID DISORDER: ICD-10-CM

## 2023-05-25 DIAGNOSIS — F41.9 ANXIETY: ICD-10-CM

## 2023-05-25 DIAGNOSIS — Z13.220 SCREENING FOR LIPID DISORDERS: ICD-10-CM

## 2023-05-25 DIAGNOSIS — J30.2 SEASONAL ALLERGIES: ICD-10-CM

## 2023-05-25 DIAGNOSIS — Z84.2 FAMILY HISTORY OF PCOS: ICD-10-CM

## 2023-05-25 RX ORDER — BUPROPION HYDROCHLORIDE 150 MG/1
150 TABLET ORAL DAILY
Qty: 90 TABLET | Refills: 0 | Status: SHIPPED | OUTPATIENT
Start: 2023-05-25

## 2023-05-25 RX ORDER — CETIRIZINE HYDROCHLORIDE 10 MG/1
10 TABLET ORAL DAILY
Qty: 90 TABLET | Refills: 0 | Status: SHIPPED | OUTPATIENT
Start: 2023-05-25

## 2023-05-25 RX ORDER — MONTELUKAST SODIUM 10 MG/1
10 TABLET ORAL NIGHTLY
Qty: 90 TABLET | Refills: 0 | Status: SHIPPED | OUTPATIENT
Start: 2023-05-25

## 2023-05-25 NOTE — PATIENT INSTRUCTIONS
Please make appt for OB Gyn [facility] for well woman exam.     Continue current plan of care as discussed.   Take medication as ordered (if applicable).    Take 10mg melatonin nightly.     Practice good sleep hygiene.  Eat a well balanced diet with fresh fruit and vegetables.    Drink at least 8 bottles of water or equivalent per day.     Limit sweetened beverages, sodas, juices.    Bake, boil, broil or grill your food, avoid eating fried foods.   Exercise at least 150 minutes per week.

## 2023-05-25 NOTE — PROGRESS NOTES
May 25, 2023    Hello, may I speak with Kelsi Hua?    My name is [email or web address]      I am  with [address]  [facility]  [address] [place] [address] [address]  [place] [address] [phone].    Before we get started may I verify your date of birth? 2000    I am calling to officially welcome you to our practice and ask about your recent visit. Is this a good time to talk? yes    Tell me about your visit with us. What things went well?  it was ok       We're always looking for ways to make our patients' experiences even better. Do you have recommendations on ways we may improve?  no    Overall were you satisfied with your first visit to our practice? yes       I appreciate you taking the time to speak with me today. Is there anything else I can do for you? no      Thank you, and have a great day.
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
## 2023-05-26 LAB
ALBUMIN SERPL-MCNC: 4 G/DL (ref 3.9–5)
ALBUMIN/GLOB SERPL: 1.4 {RATIO} (ref 1.2–2.2)
ALP SERPL-CCNC: 113 IU/L (ref 44–121)
ALT SERPL-CCNC: 18 IU/L (ref 0–32)
AST SERPL-CCNC: 18 IU/L (ref 0–40)
BASOPHILS # BLD AUTO: 0 X10E3/UL (ref 0–0.2)
BASOPHILS NFR BLD AUTO: 1 %
BILIRUB SERPL-MCNC: 0.2 MG/DL (ref 0–1.2)
BUN SERPL-MCNC: 16 MG/DL (ref 6–20)
BUN/CREAT SERPL: 21 (ref 9–23)
CALCIUM SERPL-MCNC: 9.1 MG/DL (ref 8.7–10.2)
CHLORIDE SERPL-SCNC: 101 MMOL/L (ref 96–106)
CHOLEST SERPL-MCNC: 131 MG/DL (ref 100–199)
CO2 SERPL-SCNC: 23 MMOL/L (ref 20–29)
CREAT SERPL-MCNC: 0.77 MG/DL (ref 0.57–1)
EGFRCR SERPLBLD CKD-EPI 2021: 110 ML/MIN/1.73
EOSINOPHIL # BLD AUTO: 0.1 X10E3/UL (ref 0–0.4)
EOSINOPHIL NFR BLD AUTO: 2 %
ERYTHROCYTE [DISTWIDTH] IN BLOOD BY AUTOMATED COUNT: 14.1 % (ref 11.7–15.4)
GLOBULIN SER CALC-MCNC: 2.9 G/DL (ref 1.5–4.5)
GLUCOSE SERPL-MCNC: 78 MG/DL (ref 70–99)
HBA1C MFR BLD: 5.6 % (ref 4.8–5.6)
HCT VFR BLD AUTO: 40.4 % (ref 34–46.6)
HDLC SERPL-MCNC: 45 MG/DL
HGB BLD-MCNC: 13 G/DL (ref 11.1–15.9)
IMM GRANULOCYTES # BLD AUTO: 0 X10E3/UL (ref 0–0.1)
IMM GRANULOCYTES NFR BLD AUTO: 0 %
LDLC SERPL CALC-MCNC: 72 MG/DL (ref 0–99)
LYMPHOCYTES # BLD AUTO: 1.8 X10E3/UL (ref 0.7–3.1)
LYMPHOCYTES NFR BLD AUTO: 30 %
MCH RBC QN AUTO: 24.8 PG (ref 26.6–33)
MCHC RBC AUTO-ENTMCNC: 32.2 G/DL (ref 31.5–35.7)
MCV RBC AUTO: 77 FL (ref 79–97)
MONOCYTES # BLD AUTO: 0.5 X10E3/UL (ref 0.1–0.9)
MONOCYTES NFR BLD AUTO: 8 %
NEUTROPHILS # BLD AUTO: 3.6 X10E3/UL (ref 1.4–7)
NEUTROPHILS NFR BLD AUTO: 59 %
PLATELET # BLD AUTO: 343 X10E3/UL (ref 150–450)
POTASSIUM SERPL-SCNC: 4.7 MMOL/L (ref 3.5–5.2)
PROT SERPL-MCNC: 6.9 G/DL (ref 6–8.5)
RBC # BLD AUTO: 5.25 X10E6/UL (ref 3.77–5.28)
SODIUM SERPL-SCNC: 138 MMOL/L (ref 134–144)
TRIGL SERPL-MCNC: 66 MG/DL (ref 0–149)
TSH SERPL DL<=0.005 MIU/L-ACNC: 2.64 UIU/ML (ref 0.45–4.5)
VLDLC SERPL CALC-MCNC: 14 MG/DL (ref 5–40)
WBC # BLD AUTO: 6 X10E3/UL (ref 3.4–10.8)

## 2024-03-12 ENCOUNTER — E-VISIT (OUTPATIENT)
Dept: FAMILY MEDICINE CLINIC | Facility: TELEHEALTH | Age: 24
End: 2024-03-12
Payer: MEDICAID

## 2024-03-13 NOTE — EXTERNAL PATIENT INSTRUCTIONS
Note   I havep rescribed Diflucan. If your symptoms do not improve orbecome worse, follow up with your GYN.   Diagnosis   Yeast infection (vulvovaginal candidiasis)   My name is WILLEM Sharp, JEAN-BC, and I'm a healthcare provider at [facility]. Based on your interview, I see you have a yeast infection. A yeast infection isn't considered a sexually transmitted infection (STI).   Medications   Your pharmacy   [facility] [id number] [facility] [address] [facility] [place] [address] [phone]     Prescription   Fluconazole (150mg): Take 1 tablet by mouth once for 1 day as a single dose. If symptoms are still present after 3 days, you may take a second tablet.   About your diagnosis   Your vagina naturally and normally contains a balance of yeast and bacteria. When this balance is upset, an overgrowth of yeast can occur, causing the symptoms of a yeast infection. Antibiotic use, hormonal changes, poorly controlled diabetes, and stress can all affect the balance of organisms in the vagina.   Vaginal yeast infections are very common. In fact, about 3 in 4 women will have a yeast infection at some point in their lives. Yeast infection symptoms aren't usually serious.   What to expect   If you follow this treatment plan, you should feel better within 1 week.   When to seek care   Call us at [phone]   with any sudden or unexpected symptoms.    Symptoms that change or get worse.    Symptoms that don't go away even after completing your treatment plan.    A fever of 101F or higher.    Vaginal discharge with an unusual odor.    Frothy or foamy vaginal discharge.    Green or yellow vaginal discharge.    Spotting or bleeding unrelated to your period.    Severe abdominal cramping or pain.    Sores on your genital area.    Vomiting.   Other treatment   For vaginal itching or burning, apply a cold compress or washcloth.   Prevention    Wear cotton underwear. If you wear pantyhose or tights, choose brands with a cotton  crotch.    Wear loose-fitting clothes made from natural fibers.    Change out of workout clothes right after exercising.    After bathing, dry off completely before you get dressed.    Always wipe from front to back when you use the toilet.    Use only unscented and dye-free toilet paper.    Consider sleeping without underwear.    Use only unscented sanitary pads or tampons.    It's best to avoid douching products. Douching can increase the risk of vaginal infections, such as yeast infections.   Your provider   Your diagnosis was provided by WILLEM Sharp, [facility], a member of your trusted care team at [facility].   If you have any questions, call us at [phone]  .

## 2024-03-13 NOTE — E-VISIT TREATED
Chief Complaint: Yeast infection   Patient introduction   Patient is 23-year-old female presenting with more than 7 days of vulvar pruritus, vaginal pruritus, vulvar burning, vaginal burning, change in vaginal discharge, and dysuria.   General presentation   Describes vaginal discharge as thick and clumpy and white.   Burning during the entire duration of urination.   Vaginal symptoms include genital erythema and genital edema.   Sexually active in the past 90 days. No new sex partner in previous 2 weeks. No treatment for an STI within the past 3 months.   No hormonal medication. Has not recently used douching products or products containing nonoxynol-9.   No new or recent use of possible irritants. Has not taken antibiotics in the last 2 weeks.   Positive history of vulvovaginal candidiasis, but no episodes in the previous 12 months. Current symptoms are similar to previous episodes of vulvovaginal candidiasis.   Has tried an OTC topical yeast infection treatment for current symptoms.   No history of treatment for bacterial vaginosis.   Review of red flags/alarm symptoms:    No frothy or foamy vaginal discharge    No symptoms suggesting urinary tract infection    No symptoms suggesting cellulitis    No genital trauma    No genital sores    No abdominal or pelvic surgery within the last month    No fever    No vomiting    No abdominal pain    No retained foreign object in vagina    No treatment for an STI in the last 3 months    No sexual partner tested positive for an STI   Pregnancy/menstrual status/breastfeeding: Not pregnant. Not breastfeeding. Regarding date of last menstrual period, patient writes: I do not have regular periods. Patient does not menstruate or no longer menstruates.   Preferred medication route:   Prefers oral treatment option.   Current medications   Not taking other medications or supplements.   Medication allergies   None.   Medication contraindication review   None.   Past medical  history   No diabetes mellitus.   Immune conditions: No immunocompromising conditions.   No history of cancer.   Social history   Never smoked tobacco.   Patient-submitted comments   Patient was asked if they had anything to add about their symptoms. Patient writes: 2/24 mild itching 3/7 itching worse 3/8 white discharge, start 3d monistat ovule 3/10 finish monistat, symptoms better 3/12 yeast getting worse again, itching, tried monistat injectable cream it burned horribly could not tolerate for more than 10min .   Patient did not request an excuse note.   Assessment   Vulvovaginal candidiasis.   This is the likely diagnosis based on patient's interview responses, including:    Symptom profile    Prior diagnosis of vulvovaginal candidiasis with similar symptoms   Plan   Medications:    fluconazole 150 mg tablet RX 150mg 1 tab PO once 1d as a single dose for vaginal yeast infection. Repeat in 72 hours if symptoms persist. Amount is 2 tab.   The patient's prescription will be sent to:   [facility] [id number] [place] [address]   Phone: [phone]     Fax: [phone]   Education:    Condition and causes    Prevention    Treatment and self-care    When to call provider   Follow-up:   Patient to follow up as needed for progression or lack of improvement in symptoms within 7 to 10 days.   ----------   Electronically signed by WILLEM Sharp FNP-BC on 2024-03-12 at 23:07PM   ----------   Patient Interview Transcript:   Which of these symptoms are bothering you? Select all that apply.    Itching around my vagina    Itching in my vagina    Burning around my vagina    Burning in my vagina    Vaginal discharge that looks different than usual    Burning with urination   Not selected:    Fishy-smelling vaginal discharge    Pain during sex    None of the above   Do you have any of these symptoms? Select all that apply.    None of the above   Not selected:    Frequent, small amounts of urine    Sudden,  strong urge to urinate    Cloudy urine    Strong-smelling urine    Blood in the urine   How long have you had these symptoms? Select one.    More than 7 days   Not selected:    Less than 24 hours    1 to 3 days    4 to 7 days   How would you describe your vaginal discharge? Select one.    Thick and clumpy, like cottage cheese   Not selected:    Thin    Frothy or foamy    Other (specify)   What color is your vaginal discharge? Select one.    White   Not selected:    Gray or off-white    Yellow    Green    Other (specify)   You told us that it burns when you urinate. When exactly does it burn? Select one.    The entire time I'm urinating   Not selected:    Only when I start urinating    Only when I finish urinating    I'm not sure   Do you have any of these vaginal symptoms? Select all that apply.    Redness    Swelling   Not selected:    Pain    Dryness    Darkening of the skin of the vulva    Unexpected bleeding or spotting between periods or after menopause    None of the above   Do any of these statements apply to the redness or swelling around your vagina? Select all that apply.    None of these   Not selected:    The area is spreading and becoming larger    The area feels unusually warm to the touch    The area feels firm to the touch    There are also bumps that are draining pus   Since your symptoms started, have you used a vaginal health screening kit to check the acidity (pH) of your vaginal discharge? These kits are available without a prescription at many drug stores and pharmacies. Common brands include Monistat Complete Care Vaginal Health Test and Vagisil Screening Kit. Select one.    No   Not selected:    Yes   Have you noticed any sores on your genital area? This includes blisters or ulcers. Select one.    No   Not selected:    Yes   Along with your vaginal symptoms, have you had any of these symptoms? Select all that apply.    None of the above   Not selected:    Fever    Vomiting    Abdominal  (stomach) pain   Before your symptoms began, did you have an injury to your genital area or vagina? Select one.    No   Not selected:    Yes   Could an object like a tampon or condom be stuck inside your vagina? Select one.    No   Not selected:    Yes   In the 2 weeks before your symptoms began, did you use either of these products? Select all that apply.    None of the above   Not selected:    Douching products    Products containing nonoxynol-9, a spermicide found in condoms, sponges, vaginal films, and jellies   In the week before your symptoms started, did any of these come into contact with your genital area? Select all that apply.    None of the above   Not selected:    New soap    Underwear washed with a new laundry detergent    New sex toy    New cream or lotion    New medication    New perfume    New feminine or flushable wipe    Other new product (specify)   Have you had a yeast infection before? Select one.    Yes, and my current symptoms feel the same as before   Not selected:    Yes, but my current symptoms feel different than before    No   How many yeast infections have you had in the last 12 months? Select one.    0   Not selected:    1 to 3    4 or more   Have you ever been treated for bacterial vaginosis (BV)? Select one.    No   Not selected:    Yes   In the past, have you developed yeast infections as a result of taking oral antibiotics? Select one.    No   Not selected:    Yes   Were you sexually active at any time in the last 3 months? Select one.    Yes   Not selected:    No   Have you had sex with a new partner in the last 2 weeks? Select one.    No   Not selected:    Yes   Have you had sex with 3 or more partners in the last 3 months? Select one.    No   Not selected:    Yes   In the last 3 months, were you treated for a sexually transmitted infection (STI)? Examples of STIs include chlamydia, gonorrhea, trichomoniasis (trich), herpes, or syphilis. Select one.    No   Not selected:    Yes    Has your sexual partner(s) recently tested positive for a sexually transmitted infection (STI)? Select all that apply.    No, not that I know of   Not selected:    Yes, chlamydia    Yes, gonorrhea    _Yes, trichomoniasis (trich) _    Other (specify)   Are you pregnant? Select one.    No   Not selected:    Yes   When was your last menstrual period? If you don't currently have periods or no longer have periods, please briefly explain.    I do not have regular periods   Are you breastfeeding? Select one.    No   Not selected:    Yes   Did your symptoms start before, during, or after your menstrual period? Select one.    I don't currently have periods or no longer have periods   Not selected:    Before    During    After    I'm not sure   Have you recently had abdominal or pelvic surgery? Select one.    No   Not selected:    Yes, within the last month    Yes, within the last 3 months   Are you currently being treated for Type 1 or Type 2 diabetes? Select one.    No   Not selected:    Yes   Do you have any of these conditions that can affect the immune system? Scroll to see all options. Select all that apply.    None of these   Not selected:    History of bone marrow transplant    Chronic kidney disease    Chronic liver disease (including cirrhosis)    HIV/AIDS    Inflammatory bowel disease (Crohn's disease or ulcerative colitis)    Lupus    Moderate to severe plaque psoriasis    Multiple sclerosis    Rheumatoid arthritis    Sickle cell anemia    Alpha or beta thalassemia    History of kidney, liver, heart, or other solid organ transplant    History of liver, heart, or other solid organ transplant    History of spleen removal    An autoimmune disorder not listed here (specify)    A condition requiring treatment with long-term use of oral steroids (such as prednisone, prednisolone, or dexamethasone) (specify)   Have you ever been diagnosed with cancer? Select one.    No   Not selected:    Yes, I have cancer now    Yes,  "but I'm in remission   Have you been treated for antibiotic-associated colitis in the last month? This is also called \"C. diff colitis.\" It's commonly caused by the bacteria Clostridium difficile. Select one.    No   Not selected:    Yes   Have you ever been diagnosed with the heart condition known as prolonged QT interval or QT prolongation? Select one.    No   Not selected:    Yes   Do you smoke tobacco? Select one.    No, never   Not selected:    Yes, every day    Yes, some days    No, I quit   Have you tried any of these over-the-counter treatments for your current symptoms? Select all that apply.    Vaginal cream, ointment, or suppository for yeast infection   Not selected:    Anti-itch cream or ointment containing hydrocortisone    Vaginal wash    Vaginal wipes, such as Summer's Estella    Homeopathic treatment    Other (specify)    I haven't tried anything for my symptoms   Do you take or use any of these medications containing estrogen or progesterone? Select one.    None of the above   Not selected:    Birth control pills    Hormonal intrauterine device (IUD)    Contraceptive patch    Vaginal ring, such as NuvaRing    Birth control shot, such as Depo-Provera    Hormone replacement therapy (HRT), such as oral and topical medication, vaginal ring, and transdermal patch   In the last 2 weeks, have you taken oral antibiotics? Oral antibiotics are medications that you take by mouth to treat a bacterial infection. Select one.    No   Not selected:    Yes   Are you taking any other medications, vitamins, or supplements? Select one.    No   Not selected:    Yes   Have you ever had an allergic or bad reaction to any medication? Select one.    No   Not selected:    Yes   Have you used the medication disulfiram (Antabuse) in the last 2 weeks? Select one.    No   Not selected:    Yes   If medication is needed, would you prefer oral or vaginal medication? - Oral medications are pills that you swallow. - Vaginal medications " are gels, creams, ointments, or suppositories that are placed in the vagina. We'll try to provide medication in the form you prefer, but that's not always possible. Select one.    Oral   Not selected:    Vaginal    No preference   Do you need a doctor's note? A doctor's note confirms that you received care today and states when you can return to school or work. It does not contain information about your diagnosis or treatment plan. Your provider will make the final decision on whether to give you a doctor's note. Doctor's notes CANNOT be backdated. Select one.    No   Not selected:    Today only (1 day)    Today and tomorrow (2 days)    3 days   Is there anything you'd like to add about your symptoms? Please limit your comments to the symptoms covered in this interview. If you include comments about other concerns, your provider may recommend that you be seen in person.    __2/24 mild itching 3/7 itching worse 3/8 white discharge, start 3d monistat ovule 3/10 finish monistat, symptoms better 3/12 yeast getting worse again, itching, tried monistat injectable cream it burned horribly could not tolerate for more than 10min __   ----------   Medical history   Medical history data does not currently exist for this patient.

## 2024-09-05 PROCEDURE — 93298 REM INTERROG DEV EVAL SCRMS: CPT | Performed by: INTERNAL MEDICINE

## 2024-10-03 ENCOUNTER — TELEPHONE (OUTPATIENT)
Dept: CARDIOLOGY | Facility: CLINIC | Age: 24
End: 2024-10-03
Payer: MEDICAID

## 2024-10-03 NOTE — TELEPHONE ENCOUNTER
Pt informed by billing department she is being billed for result monitoring for loop recorder. She states she has never received a report and would like to know if this is correct.

## 2024-10-06 PROCEDURE — 93298 REM INTERROG DEV EVAL SCRMS: CPT | Performed by: INTERNAL MEDICINE

## 2024-10-10 NOTE — TELEPHONE ENCOUNTER
Spoke to patient, explained to her settings on her loop recorder, monthly reports vs event reports. No events have occurred since she has been implanted, therefore she has not gotten a call. She still feel palpitations at times. Explained to her may be PAC or PVC, loop recorder does not record this, but she may still be experiencing episodes. She verbalized understanding.

## 2024-10-31 ENCOUNTER — TELEPHONE (OUTPATIENT)
Dept: CARDIOLOGY | Facility: CLINIC | Age: 24
End: 2024-10-31
Payer: MEDICAID

## 2024-10-31 NOTE — TELEPHONE ENCOUNTER
Request through [facility] to transfer to [facility]. Spoke to patient, she is transferring due to insurance. Released in [facility].